# Patient Record
Sex: MALE | Race: WHITE | Employment: FULL TIME | URBAN - METROPOLITAN AREA
[De-identification: names, ages, dates, MRNs, and addresses within clinical notes are randomized per-mention and may not be internally consistent; named-entity substitution may affect disease eponyms.]

---

## 2017-02-03 PROBLEM — L71.8 OCULAR ROSACEA: Status: ACTIVE | Noted: 2017-02-03

## 2018-01-08 ENCOUNTER — LAB ENCOUNTER (OUTPATIENT)
Dept: LAB | Age: 58
End: 2018-01-08
Attending: FAMILY MEDICINE
Payer: COMMERCIAL

## 2018-01-08 DIAGNOSIS — J30.2 SEASONAL ALLERGIC RHINITIS: ICD-10-CM

## 2018-01-08 DIAGNOSIS — N40.0 BENIGN ENLARGEMENT OF PROSTATE: Primary | ICD-10-CM

## 2018-01-08 DIAGNOSIS — M79.605 LEFT LEG PAIN: ICD-10-CM

## 2018-01-08 DIAGNOSIS — E78.5 HYPERLIPEMIA: ICD-10-CM

## 2018-01-08 LAB
ALBUMIN SERPL-MCNC: 4.1 G/DL (ref 3.5–4.8)
ALP LIVER SERPL-CCNC: 76 U/L (ref 45–117)
ALT SERPL-CCNC: 32 U/L (ref 17–63)
AST SERPL-CCNC: 21 U/L (ref 15–41)
BILIRUB SERPL-MCNC: 0.8 MG/DL (ref 0.1–2)
BUN BLD-MCNC: 15 MG/DL (ref 8–20)
CALCIUM BLD-MCNC: 9.1 MG/DL (ref 8.3–10.3)
CHLORIDE: 108 MMOL/L (ref 101–111)
CHOLEST SMN-MCNC: 209 MG/DL (ref ?–200)
CO2: 25 MMOL/L (ref 22–32)
CREAT BLD-MCNC: 0.96 MG/DL (ref 0.7–1.3)
GLUCOSE BLD-MCNC: 95 MG/DL (ref 70–99)
HDLC SERPL-MCNC: 55 MG/DL (ref 45–?)
HDLC SERPL: 3.8 {RATIO} (ref ?–4.97)
LDLC SERPL CALC-MCNC: 139 MG/DL (ref ?–130)
M PROTEIN MFR SERPL ELPH: 7.2 G/DL (ref 6.1–8.3)
NONHDLC SERPL-MCNC: 154 MG/DL (ref ?–130)
POTASSIUM SERPL-SCNC: 4.1 MMOL/L (ref 3.6–5.1)
SODIUM SERPL-SCNC: 141 MMOL/L (ref 136–144)
TRIGL SERPL-MCNC: 73 MG/DL (ref ?–150)
VLDLC SERPL CALC-MCNC: 15 MG/DL (ref 5–40)

## 2018-01-08 PROCEDURE — 80053 COMPREHEN METABOLIC PANEL: CPT

## 2018-01-08 PROCEDURE — 80061 LIPID PANEL: CPT

## 2018-01-08 PROCEDURE — 36415 COLL VENOUS BLD VENIPUNCTURE: CPT

## 2018-06-21 ENCOUNTER — LAB ENCOUNTER (OUTPATIENT)
Dept: LAB | Age: 58
End: 2018-06-21
Attending: FAMILY MEDICINE
Payer: COMMERCIAL

## 2018-06-21 DIAGNOSIS — Z13.89 ENCOUNTER FOR ROUTINE SCREENING FOR MALFORMATION USING ULTRASONICS: ICD-10-CM

## 2018-06-21 DIAGNOSIS — Z00.00 ROUTINE GENERAL MEDICAL EXAMINATION AT A HEALTH CARE FACILITY: Primary | ICD-10-CM

## 2018-06-21 DIAGNOSIS — Z12.5 SPECIAL SCREENING FOR MALIGNANT NEOPLASM OF PROSTATE: ICD-10-CM

## 2018-06-21 PROCEDURE — 84443 ASSAY THYROID STIM HORMONE: CPT

## 2018-06-21 PROCEDURE — 80061 LIPID PANEL: CPT

## 2018-06-21 PROCEDURE — 85025 COMPLETE CBC W/AUTO DIFF WBC: CPT

## 2018-06-21 PROCEDURE — 84153 ASSAY OF PSA TOTAL: CPT

## 2018-06-21 PROCEDURE — 80053 COMPREHEN METABOLIC PANEL: CPT

## 2018-12-21 ENCOUNTER — LAB ENCOUNTER (OUTPATIENT)
Dept: LAB | Age: 58
End: 2018-12-21
Attending: FAMILY MEDICINE
Payer: COMMERCIAL

## 2018-12-21 DIAGNOSIS — E78.5 HYPERLIPEMIA: ICD-10-CM

## 2018-12-21 DIAGNOSIS — R53.83 FATIGUE: ICD-10-CM

## 2018-12-21 DIAGNOSIS — R68.89 MECHANICAL AND MOTOR PROBLEMS WITH INTERNAL ORGANS: ICD-10-CM

## 2018-12-21 DIAGNOSIS — N52.9 IMPOTENCE OF ORGANIC ORIGIN: Primary | ICD-10-CM

## 2018-12-21 PROCEDURE — 82607 VITAMIN B-12: CPT

## 2018-12-21 PROCEDURE — 80053 COMPREHEN METABOLIC PANEL: CPT

## 2018-12-21 PROCEDURE — 80061 LIPID PANEL: CPT

## 2019-02-25 ENCOUNTER — HOSPITAL ENCOUNTER (INPATIENT)
Facility: HOSPITAL | Age: 59
LOS: 2 days | Discharge: HOME | DRG: 661 | End: 2019-02-27
Attending: EMERGENCY MEDICINE | Admitting: HOSPITALIST
Payer: COMMERCIAL

## 2019-02-25 ENCOUNTER — HOSPITAL ENCOUNTER (OUTPATIENT)
Dept: CT IMAGING | Facility: HOSPITAL | Age: 59
Discharge: HOME | End: 2019-02-25
Attending: FAMILY MEDICINE
Payer: COMMERCIAL

## 2019-02-25 ENCOUNTER — LAB REQUISITION (OUTPATIENT)
Dept: LAB | Facility: HOSPITAL | Age: 59
End: 2019-02-25
Payer: COMMERCIAL

## 2019-02-25 DIAGNOSIS — N20.0 RENAL STONE: ICD-10-CM

## 2019-02-25 DIAGNOSIS — R10.9 FLANK PAIN: ICD-10-CM

## 2019-02-25 DIAGNOSIS — N20.0 CALCULUS OF KIDNEY: ICD-10-CM

## 2019-02-25 DIAGNOSIS — N23 RENAL COLIC: Primary | ICD-10-CM

## 2019-02-25 DIAGNOSIS — R10.9 ABDOMINAL PAIN: ICD-10-CM

## 2019-02-25 DIAGNOSIS — N20.1 CALCULI, URETER: ICD-10-CM

## 2019-02-25 LAB
ALBUMIN SERPL-MCNC: 3.9 G/DL (ref 3.4–5)
ALBUMIN/GLOB SERPL: 1.2 {RATIO} (ref 1–2)
ALP LIVER SERPL-CCNC: 79 U/L (ref 45–117)
ALT SERPL-CCNC: 32 U/L (ref 16–61)
ANION GAP SERPL CALC-SCNC: 8 MMOL/L (ref 0–18)
AST SERPL-CCNC: 17 U/L (ref 15–37)
BASOPHILS # BLD AUTO: 0.02 X10(3) UL (ref 0–0.2)
BASOPHILS NFR BLD AUTO: 0.3 %
BILIRUB SERPL-MCNC: 0.5 MG/DL (ref 0.1–2)
BILIRUB UR QL STRIP.AUTO: NEGATIVE
BILIRUB UR QL STRIP.AUTO: NEGATIVE
BUN BLD-MCNC: 14 MG/DL (ref 7–18)
BUN/CREAT SERPL: 12.6 (ref 10–20)
CALCIUM BLD-MCNC: 8.7 MG/DL (ref 8.5–10.1)
CHLORIDE SERPL-SCNC: 108 MMOL/L (ref 98–107)
CLARITY UR REFRACT.AUTO: CLEAR
CLARITY UR REFRACT.AUTO: CLEAR
CO2 SERPL-SCNC: 26 MMOL/L (ref 21–32)
COLOR UR AUTO: COLORLESS
CREAT BLD-MCNC: 1.11 MG/DL (ref 0.7–1.3)
DEPRECATED RDW RBC AUTO: 41.8 FL (ref 35.1–46.3)
EOSINOPHIL # BLD AUTO: 0.08 X10(3) UL (ref 0–0.7)
EOSINOPHIL NFR BLD AUTO: 1.4 %
ERYTHROCYTE [DISTWIDTH] IN BLOOD BY AUTOMATED COUNT: 12.4 % (ref 11–15)
GLOBULIN PLAS-MCNC: 3.3 G/DL (ref 2.8–4.4)
GLUCOSE BLD-MCNC: 133 MG/DL (ref 70–99)
GLUCOSE UR STRIP.AUTO-MCNC: NEGATIVE MG/DL
GLUCOSE UR STRIP.AUTO-MCNC: NEGATIVE MG/DL
HCT VFR BLD AUTO: 39.2 % (ref 39–53)
HGB BLD-MCNC: 13.8 G/DL (ref 13–17.5)
IMM GRANULOCYTES # BLD AUTO: 0.01 X10(3) UL (ref 0–1)
IMM GRANULOCYTES NFR BLD: 0.2 %
KETONES UR STRIP.AUTO-MCNC: NEGATIVE MG/DL
KETONES UR STRIP.AUTO-MCNC: NEGATIVE MG/DL
LEUKOCYTE ESTERASE UR QL STRIP.AUTO: NEGATIVE
LYMPHOCYTES # BLD AUTO: 1.43 X10(3) UL (ref 1–4)
LYMPHOCYTES NFR BLD AUTO: 24.4 %
M PROTEIN MFR SERPL ELPH: 7.2 G/DL (ref 6.4–8.2)
MCH RBC QN AUTO: 32.5 PG (ref 26–34)
MCHC RBC AUTO-ENTMCNC: 35.2 G/DL (ref 31–37)
MCV RBC AUTO: 92.5 FL (ref 80–100)
MONOCYTES # BLD AUTO: 0.51 X10(3) UL (ref 0.1–1)
MONOCYTES NFR BLD AUTO: 8.7 %
NEUTROPHILS # BLD AUTO: 3.81 X10 (3) UL (ref 1.5–7.7)
NEUTROPHILS # BLD AUTO: 3.81 X10(3) UL (ref 1.5–7.7)
NEUTROPHILS NFR BLD AUTO: 65 %
NITRITE UR QL STRIP.AUTO: NEGATIVE
NITRITE UR QL STRIP.AUTO: NEGATIVE
OSMOLALITY SERPL CALC.SUM OF ELEC: 296 MOSM/KG (ref 275–295)
PH UR STRIP.AUTO: 5 [PH] (ref 4.5–8)
PH UR STRIP.AUTO: 6 [PH] (ref 4.5–8)
PLATELET # BLD AUTO: 286 10(3)UL (ref 150–450)
POTASSIUM SERPL-SCNC: 3.6 MMOL/L (ref 3.5–5.1)
PROT UR STRIP.AUTO-MCNC: NEGATIVE MG/DL
PROT UR STRIP.AUTO-MCNC: NEGATIVE MG/DL
RBC # BLD AUTO: 4.24 X10(6)UL (ref 4.3–5.7)
RBC #/AREA URNS AUTO: >10 /HPF
SODIUM SERPL-SCNC: 142 MMOL/L (ref 136–145)
SP GR UR STRIP.AUTO: 1.01 (ref 1–1.03)
SP GR UR STRIP.AUTO: <1.005 (ref 1–1.03)
UROBILINOGEN UR STRIP.AUTO-MCNC: <2 MG/DL
UROBILINOGEN UR STRIP.AUTO-MCNC: <2 MG/DL
WBC # BLD AUTO: 5.9 X10(3) UL (ref 4–11)

## 2019-02-25 PROCEDURE — 74176 CT ABD & PELVIS W/O CONTRAST: CPT | Performed by: FAMILY MEDICINE

## 2019-02-25 PROCEDURE — 87086 URINE CULTURE/COLONY COUNT: CPT | Performed by: FAMILY MEDICINE

## 2019-02-25 PROCEDURE — 99223 1ST HOSP IP/OBS HIGH 75: CPT | Performed by: HOSPITALIST

## 2019-02-25 PROCEDURE — 81001 URINALYSIS AUTO W/SCOPE: CPT | Performed by: FAMILY MEDICINE

## 2019-02-25 RX ORDER — HYDROMORPHONE HYDROCHLORIDE 1 MG/ML
0.5 INJECTION, SOLUTION INTRAMUSCULAR; INTRAVENOUS; SUBCUTANEOUS EVERY 30 MIN PRN
Status: DISCONTINUED | OUTPATIENT
Start: 2019-02-25 | End: 2019-02-26

## 2019-02-25 RX ORDER — LORATADINE 10 MG/1
10 TABLET ORAL DAILY
COMMUNITY

## 2019-02-25 RX ORDER — MINERAL OIL, PETROLATUM 425; 568 MG/G; MG/G
OINTMENT OPHTHALMIC AS NEEDED
COMMUNITY
End: 2021-11-29

## 2019-02-25 RX ORDER — KETOROLAC TROMETHAMINE 30 MG/ML
15 INJECTION, SOLUTION INTRAMUSCULAR; INTRAVENOUS ONCE
Status: COMPLETED | OUTPATIENT
Start: 2019-02-25 | End: 2019-02-25

## 2019-02-25 RX ORDER — LOTEPREDNOL ETABONATE 5 MG/ML
1 SUSPENSION/ DROPS OPHTHALMIC 2 TIMES DAILY
COMMUNITY
End: 2020-11-19

## 2019-02-25 RX ORDER — SODIUM CHLORIDE 9 MG/ML
INJECTION, SOLUTION INTRAVENOUS CONTINUOUS
Status: DISCONTINUED | OUTPATIENT
Start: 2019-02-25 | End: 2019-02-26

## 2019-02-26 ENCOUNTER — APPOINTMENT (OUTPATIENT)
Dept: GENERAL RADIOLOGY | Facility: HOSPITAL | Age: 59
DRG: 661 | End: 2019-02-26
Attending: UROLOGY
Payer: COMMERCIAL

## 2019-02-26 ENCOUNTER — ANESTHESIA EVENT (OUTPATIENT)
Dept: SURGERY | Facility: HOSPITAL | Age: 59
DRG: 661 | End: 2019-02-26
Payer: COMMERCIAL

## 2019-02-26 ENCOUNTER — ANESTHESIA (OUTPATIENT)
Dept: SURGERY | Facility: HOSPITAL | Age: 59
DRG: 661 | End: 2019-02-26
Payer: COMMERCIAL

## 2019-02-26 LAB — POTASSIUM SERPL-SCNC: 4.4 MMOL/L (ref 3.5–5.1)

## 2019-02-26 PROCEDURE — 0T778DZ DILATION OF LEFT URETER WITH INTRALUMINAL DEVICE, VIA NATURAL OR ARTIFICIAL OPENING ENDOSCOPIC: ICD-10-PCS | Performed by: UROLOGY

## 2019-02-26 PROCEDURE — BT141ZZ FLUOROSCOPY OF KIDNEYS, URETERS AND BLADDER USING LOW OSMOLAR CONTRAST: ICD-10-PCS | Performed by: UROLOGY

## 2019-02-26 PROCEDURE — 99232 SBSQ HOSP IP/OBS MODERATE 35: CPT | Performed by: HOSPITALIST

## 2019-02-26 DEVICE — STENT URET 6F 28CM ULSMTH: Type: IMPLANTABLE DEVICE | Status: FUNCTIONAL

## 2019-02-26 RX ORDER — METOCLOPRAMIDE HYDROCHLORIDE 5 MG/ML
10 INJECTION INTRAMUSCULAR; INTRAVENOUS AS NEEDED
Status: DISCONTINUED | OUTPATIENT
Start: 2019-02-26 | End: 2019-02-26 | Stop reason: HOSPADM

## 2019-02-26 RX ORDER — IBUPROFEN 400 MG/1
400 TABLET ORAL EVERY 4 HOURS PRN
Status: DISCONTINUED | OUTPATIENT
Start: 2019-02-26 | End: 2019-02-27

## 2019-02-26 RX ORDER — ONDANSETRON 2 MG/ML
4 INJECTION INTRAMUSCULAR; INTRAVENOUS EVERY 6 HOURS PRN
Status: DISCONTINUED | OUTPATIENT
Start: 2019-02-26 | End: 2019-02-27

## 2019-02-26 RX ORDER — CETIRIZINE HYDROCHLORIDE 10 MG/1
10 TABLET ORAL DAILY
Status: DISCONTINUED | OUTPATIENT
Start: 2019-02-26 | End: 2019-02-27

## 2019-02-26 RX ORDER — ACETAMINOPHEN 325 MG/1
650 TABLET ORAL EVERY 6 HOURS PRN
Status: DISCONTINUED | OUTPATIENT
Start: 2019-02-26 | End: 2019-02-27

## 2019-02-26 RX ORDER — POLYETHYLENE GLYCOL 3350 17 G/17G
17 POWDER, FOR SOLUTION ORAL DAILY PRN
Status: DISCONTINUED | OUTPATIENT
Start: 2019-02-26 | End: 2019-02-27

## 2019-02-26 RX ORDER — MORPHINE SULFATE 4 MG/ML
4 INJECTION, SOLUTION INTRAMUSCULAR; INTRAVENOUS EVERY 2 HOUR PRN
Status: DISCONTINUED | OUTPATIENT
Start: 2019-02-26 | End: 2019-02-27

## 2019-02-26 RX ORDER — SODIUM CHLORIDE 9 MG/ML
INJECTION, SOLUTION INTRAVENOUS CONTINUOUS
Status: DISCONTINUED | OUTPATIENT
Start: 2019-02-26 | End: 2019-02-27

## 2019-02-26 RX ORDER — BISACODYL 10 MG
10 SUPPOSITORY, RECTAL RECTAL
Status: DISCONTINUED | OUTPATIENT
Start: 2019-02-26 | End: 2019-02-27

## 2019-02-26 RX ORDER — ATORVASTATIN CALCIUM 10 MG/1
10 TABLET, FILM COATED ORAL NIGHTLY
Status: DISCONTINUED | OUTPATIENT
Start: 2019-02-26 | End: 2019-02-27

## 2019-02-26 RX ORDER — DEXAMETHASONE SODIUM PHOSPHATE 4 MG/ML
4 VIAL (ML) INJECTION AS NEEDED
Status: DISCONTINUED | OUTPATIENT
Start: 2019-02-26 | End: 2019-02-26 | Stop reason: HOSPADM

## 2019-02-26 RX ORDER — HYDROCODONE BITARTRATE AND ACETAMINOPHEN 5; 325 MG/1; MG/1
2 TABLET ORAL AS NEEDED
Status: DISCONTINUED | OUTPATIENT
Start: 2019-02-26 | End: 2019-02-26 | Stop reason: HOSPADM

## 2019-02-26 RX ORDER — PREDNISOLONE ACETATE 10 MG/ML
1 SUSPENSION/ DROPS OPHTHALMIC
Status: DISCONTINUED | OUTPATIENT
Start: 2019-02-26 | End: 2019-02-27

## 2019-02-26 RX ORDER — SODIUM CHLORIDE, SODIUM LACTATE, POTASSIUM CHLORIDE, CALCIUM CHLORIDE 600; 310; 30; 20 MG/100ML; MG/100ML; MG/100ML; MG/100ML
INJECTION, SOLUTION INTRAVENOUS CONTINUOUS
Status: DISCONTINUED | OUTPATIENT
Start: 2019-02-26 | End: 2019-02-26 | Stop reason: HOSPADM

## 2019-02-26 RX ORDER — LABETALOL HYDROCHLORIDE 5 MG/ML
5 INJECTION, SOLUTION INTRAVENOUS EVERY 5 MIN PRN
Status: DISCONTINUED | OUTPATIENT
Start: 2019-02-26 | End: 2019-02-26 | Stop reason: HOSPADM

## 2019-02-26 RX ORDER — MORPHINE SULFATE 4 MG/ML
2 INJECTION, SOLUTION INTRAMUSCULAR; INTRAVENOUS EVERY 2 HOUR PRN
Status: DISCONTINUED | OUTPATIENT
Start: 2019-02-26 | End: 2019-02-27

## 2019-02-26 RX ORDER — CEFAZOLIN SODIUM/WATER 2 G/20 ML
SYRINGE (ML) INTRAVENOUS
Status: DISCONTINUED | OUTPATIENT
Start: 2019-02-26 | End: 2019-02-26 | Stop reason: HOSPADM

## 2019-02-26 RX ORDER — DOCUSATE SODIUM 100 MG/1
100 CAPSULE, LIQUID FILLED ORAL 2 TIMES DAILY
Status: DISCONTINUED | OUTPATIENT
Start: 2019-02-26 | End: 2019-02-27

## 2019-02-26 RX ORDER — SODIUM PHOSPHATE, DIBASIC AND SODIUM PHOSPHATE, MONOBASIC 7; 19 G/133ML; G/133ML
1 ENEMA RECTAL ONCE AS NEEDED
Status: DISCONTINUED | OUTPATIENT
Start: 2019-02-26 | End: 2019-02-27

## 2019-02-26 RX ORDER — ACETAMINOPHEN 500 MG
1000 TABLET ORAL ONCE AS NEEDED
Status: DISCONTINUED | OUTPATIENT
Start: 2019-02-26 | End: 2019-02-26 | Stop reason: HOSPADM

## 2019-02-26 RX ORDER — NALOXONE HYDROCHLORIDE 0.4 MG/ML
80 INJECTION, SOLUTION INTRAMUSCULAR; INTRAVENOUS; SUBCUTANEOUS AS NEEDED
Status: DISCONTINUED | OUTPATIENT
Start: 2019-02-26 | End: 2019-02-26 | Stop reason: HOSPADM

## 2019-02-26 RX ORDER — METOCLOPRAMIDE HYDROCHLORIDE 5 MG/ML
10 INJECTION INTRAMUSCULAR; INTRAVENOUS EVERY 8 HOURS PRN
Status: DISCONTINUED | OUTPATIENT
Start: 2019-02-26 | End: 2019-02-27

## 2019-02-26 RX ORDER — IBUPROFEN 200 MG
200 TABLET ORAL EVERY 4 HOURS PRN
Status: DISCONTINUED | OUTPATIENT
Start: 2019-02-26 | End: 2019-02-27

## 2019-02-26 RX ORDER — MORPHINE SULFATE 4 MG/ML
1 INJECTION, SOLUTION INTRAMUSCULAR; INTRAVENOUS EVERY 2 HOUR PRN
Status: DISCONTINUED | OUTPATIENT
Start: 2019-02-26 | End: 2019-02-27

## 2019-02-26 RX ORDER — IBUPROFEN 600 MG/1
600 TABLET ORAL EVERY 4 HOURS PRN
Status: DISCONTINUED | OUTPATIENT
Start: 2019-02-26 | End: 2019-02-27

## 2019-02-26 RX ORDER — LIDOCAINE HYDROCHLORIDE 20 MG/ML
JELLY TOPICAL AS NEEDED
Status: DISCONTINUED | OUTPATIENT
Start: 2019-02-26 | End: 2019-02-26 | Stop reason: HOSPADM

## 2019-02-26 RX ORDER — ONDANSETRON 2 MG/ML
4 INJECTION INTRAMUSCULAR; INTRAVENOUS AS NEEDED
Status: DISCONTINUED | OUTPATIENT
Start: 2019-02-26 | End: 2019-02-26 | Stop reason: HOSPADM

## 2019-02-26 RX ORDER — HYDROMORPHONE HYDROCHLORIDE 1 MG/ML
0.4 INJECTION, SOLUTION INTRAMUSCULAR; INTRAVENOUS; SUBCUTANEOUS EVERY 5 MIN PRN
Status: DISCONTINUED | OUTPATIENT
Start: 2019-02-26 | End: 2019-02-26 | Stop reason: HOSPADM

## 2019-02-26 RX ORDER — CEFAZOLIN SODIUM/WATER 2 G/20 ML
2 SYRINGE (ML) INTRAVENOUS
Status: DISPENSED | OUTPATIENT
Start: 2019-02-26 | End: 2019-02-27

## 2019-02-26 RX ORDER — HYDROCODONE BITARTRATE AND ACETAMINOPHEN 5; 325 MG/1; MG/1
1 TABLET ORAL AS NEEDED
Status: DISCONTINUED | OUTPATIENT
Start: 2019-02-26 | End: 2019-02-26 | Stop reason: HOSPADM

## 2019-02-26 NOTE — ANESTHESIA POSTPROCEDURE EVALUATION
[facility] 75. Patient Status:  Inpatient   Age/Gender 62year old male MRN [id number]   [facility] SURGERY Attending Amberly Darian, [facility] Se Day # 1 PCP Neema Reagan MD       Anesthesia Post-op Note    Procedu

## 2019-02-26 NOTE — PLAN OF CARE
NURSING ADMISSION NOTE      Patient admitted via ER via stretcher. Oriented to room. Safety precautions initiated. Bed in low position. Call light in reach. Patient denied pain/n/v. Kept strict NPO. Voiding freely. VSS. Safety maintained.  Manoj

## 2019-02-26 NOTE — ED INITIAL ASSESSMENT (HPI)
Right side flank pain since earlier today, nausea. Pt was seen earlier by PMD, CT with confirmed Kidney stone, here for admission per urology.

## 2019-02-26 NOTE — H&P
[facility]  History and Physical     ella Severs Patient Status:  Inpatient    1960 MRN [id number]   [facility] 3NW-A Attending Nicky Hinojosa MD   Hosp Day # 1 PCP Claudia Campos MD     Chief Complaint: flank pa capsule Rfl: 2   Loteprednol Etabonate 0.5 % Ophthalmic Suspension Place 1 drop into both eyes 2 (two) times daily. Disp:  Rfl:    loratadine 10 MG Oral Tab Take 10 mg by mouth daily. Disp:  Rfl:    REFRESH LACRI-LUBE Ophthalmic Ointment as needed.  Disp: consult for cystoscopy  2. DL- statin   3.  Hyperglycemia- stress related    PCP Dr. Stacy Malone but pt is [facility]     Quality:  · DVT Prophylaxis: ambulate  · CODE status: full  · Alas: no  Plan of care discussed with patient     Sacha Savage MD  2/26/2019

## 2019-02-26 NOTE — BRIEF OP NOTE
Pre-Operative Diagnosis: BILATERAL URETERAL CALCULI     Post-Operative Diagnosis: LEFTT URETERAL CALCULUS, HYDRONEPHROSIS, PYURIA      Procedure Performed:   Procedure(s):  CYSTOSCOPY, BILATERAL RETROGRADE PYELOGRAMS, LEFT URETERAL STENT INSERTION    Surge

## 2019-02-26 NOTE — ANESTHESIA PREPROCEDURE EVALUATION
PRE-OP EVALUATION    Patient Name: Jitendra Rviera    Pre-op Diagnosis: Calculi, ureter [N20.1]    Procedure(s):  CYSTOSCOPY, BILATERAL RETROGRADE PYELOGRAMS, BILATERAL UTEROSCOPY WITH LASER LITHTRIPSY, INSERTION OF BILATERAL STENTS    Surgeon(s) and R sodium chloride 0.9% IV bolus 1,000 mL 1,000 mL Intravenous Once   [COMPLETED] ketorolac tromethamine (TORADOL) 30 MG/ML injection 15 mg 15 mg Intravenous Once   [] 0.9%  NaCl infusion  Intravenous Continuous   [] HYDROmorphone HCl (DILAUDID) 02/25/2019    RDW 12.4 02/25/2019    .0 02/25/2019     Lab Results   Component Value Date     02/25/2019    K 4.4 02/26/2019     (H) 02/25/2019    CO2 26.0 02/25/2019    BUN 14 02/25/2019    CREATSERUM 1.11 02/25/2019     (H) 02/2

## 2019-02-26 NOTE — PROGRESS NOTES
[facility]  Progress Note     Amarilis Tafoya Patient Status:  Inpatient    1960 MRN [id number]   [facility] 3NW-A Attending Nia Yee MD   Hosp Day # 1 PCP Leona Son MD     Chief Complaint: renal colic    S: clear by KRISHNA    Quality:  · DVT Prophylaxis: none  · CODE status: full  · Alas: no  · Central line: no    Estimated date of discharge: TBD  Discharge is dependent on: clinical stability  At this point Mr. Douglas Alcala is expected to be discharge to: home

## 2019-02-26 NOTE — CONSULTS
[facility] Urology   Consultation Note    Lanis Rank Patient Status:  Inpatient    1960 MRN [id number]   [facility] 3NW-A Attending Royal Gregory MD   Hosp Day # 1 PCP Anita Young MD      [facility] SERVICES, [facility] per day  •  atorvastatin (LIPITOR) tab 10 mg, 10 mg, Oral, Nightly  •  0.9%  NaCl infusion, , Intravenous, Continuous  •  acetaminophen (TYLENOL) tab 650 mg, 650 mg, Oral, Q6H PRN  •  ibuprofen (MOTRIN) tab 200 mg, 200 mg, Oral, Q4H PRN **OR** ibuprofen (M 02/25/2019     02/25/2019    CA 8.7 02/25/2019    ALB 3.9 02/25/2019    ALKPHO 79 02/25/2019    BILT 0.5 02/25/2019    TP 7.2 02/25/2019    AST 17 02/25/2019    ALT 32 02/25/2019     UA 2/25/19: 1-4 WBC/hpf, 0-2 RBC/hpf  UA 2/25/19: 1-4 WBC/hpf, >10 ureterovesical junction.      Impression:  Patient Active Problem List:     Allergic rhinitis, cause unspecified     Psychosexual dysfunction with inhibited sexual excitement     Anxiety state, unspecified     Donor of unspecified organ or tissue     Delgado Sanches infection. Also, the scope and laser are capable of perforating the ureter or kidney which could result in a scar or stricture requiring future procedures. These considerations were thoroughly discussed with the patient.       Recommend a cystourethroscopy,

## 2019-02-26 NOTE — ED PROVIDER NOTES
Patient Seen in: [facility] Emergency Department    History   Patient presents with:  Abdomen/Flank Pain (GI/)    Stated Complaint: c/o bilateral flank pain, kidney stone on CT    HPI    CHIEF COMPLAINT: Bilateral flank pain    HISTORY OF PRESENT SURGICAL HISTORY      sinus sx   • TONSILLECTOMY             Social History    Tobacco Use      Smoking status: Former Smoker    Alcohol use: Yes      Comment: occasionally    Drug use: No      Review of Systems    Positive for stated complaint: c/o bilate Leukocyte Esterase Urine Trace (*)     RBC URINE >10 (*)     Mucous Urine 1+ (*)     All other components within normal limits   CBC W/ DIFFERENTIAL - Abnormal; Notable for the following components:    RBC 4.24 (*)     All other components within normal and agree with admission and plan. I answered all of the patient's questions prior to admission.   Admission disposition: 2/25/2019 10:09 PM             Disposition and Plan     Clinical Impression:  Renal colic  (primary encounter diagnosis)    Disposition

## 2019-02-27 VITALS
DIASTOLIC BLOOD PRESSURE: 82 MMHG | RESPIRATION RATE: 18 BRPM | SYSTOLIC BLOOD PRESSURE: 135 MMHG | HEART RATE: 66 BPM | BODY MASS INDEX: 25 KG/M2 | WEIGHT: 174.19 LBS | OXYGEN SATURATION: 100 % | TEMPERATURE: 98 F

## 2019-02-27 PROCEDURE — 99239 HOSP IP/OBS DSCHRG MGMT >30: CPT | Performed by: INTERNAL MEDICINE

## 2019-02-27 RX ORDER — ACETAMINOPHEN AND CODEINE PHOSPHATE 300; 30 MG/1; MG/1
1 TABLET ORAL EVERY 4 HOURS PRN
Qty: 20 TABLET | Refills: 0 | Status: SHIPPED | OUTPATIENT
Start: 2019-02-27 | End: 2019-05-07 | Stop reason: ALTCHOICE

## 2019-02-27 RX ORDER — CEPHALEXIN 500 MG/1
500 CAPSULE ORAL 3 TIMES DAILY
Qty: 42 CAPSULE | Refills: 0 | Status: SHIPPED | OUTPATIENT
Start: 2019-02-27 | End: 2019-03-13

## 2019-02-27 NOTE — PROGRESS NOTES
[facility]  Progress Note     Dianne Alaniz Patient Status:  Inpatient    1960 MRN [id number]   [facility] 3NW-A Attending Nolan Taveras MD   Hosp Day # 2 PCP Brandy Bynum MD     Chief Complaint: renal colic    S: control  3. abx  2. Left renal stone 1.3cm  3. 4mm right UVJ    Plan of care: DC planning    Quality:  · DVT Prophylaxis: none  · CODE status: full  · Alas: no  · Central line: no    Estimated date of discharge:  Today  Discharge is dependent on: clinical

## 2019-02-27 NOTE — OPERATIVE REPORT
[facility]    PATIENT'S NAME: Figueroa Lawson   ATTENDING PHYSICIAN: Omkar Arenas MD   OPERATING PHYSICIAN: Brock Cohen M.D.    PATIENT ACCOUNT#:   [de-identified]    LOCATION:  [address] [place] [address]  MEDICAL RECORD #:   [id number]       DATE OF BIRTH: be passed up the ureter next to the stone; however, would not pass beyond the stone. An open-ended catheter was passed over the wire. Next, further attempts were made to pass the wire past the stone.   We were eventually able to get the wire past, and pur

## 2019-02-27 NOTE — PLAN OF CARE
Patient AOX4. Voided upon arrival back to floor. Complaints of left flank pain, declines pain medication at this time. POC discussed, will try clears/diet. Possible d/c home if o.k. With surgery/urology. No orders from urology at this time.

## 2019-02-27 NOTE — DISCHARGE SUMMARY
[facility]  DISCHARGE SUMMARY     Angy Gracia Patient Status:  Inpatient    1960 MRN [id number]   [facility] 3NW-A Attending Madeleine Matt MD   Hosp Day # 2 PCP Ignacio Lima MD     Date of Admission: 2019 this afternoon      Take 1 capsule (500 mg total) by mouth 3 (three) times daily for 14 days.    Stop taking on:  3/13/2019  Quantity:  42 capsule  Refills:  0        CONTINUE taking these medications      Instructions Prescription details   Bimatoprost 0.0 Vital signs:  Temp:  [98.2 °F (36.8 °C)] 98.2 °F (36.8 °C)  Pulse:  [66] 66  Resp:  [18] 18  BP: 135/(65) 135/82      -----------------------------------------------------------------------------------------------  PATIENT DISCHARGE INSTRUCTIONS: S

## 2019-02-27 NOTE — PLAN OF CARE
Pt seen this am by Urology PA & Dr Eliane Burr, orders for discahrge home noted. Pt tolerating diet, afebile. IV removed, site clear. Discharge instructions given. Mediations of Keflex & Tylenol #3 medications given to pt by [employer].  discharged to

## 2019-02-27 NOTE — PROGRESS NOTES
[facility]  Urology Progress Note    Yenny Hastingsheidi Patient Status:  Inpatient    1960 MRN [id number]   [facility] 3NW-A Attending Eyad George MD   Hosp Day # 2 PCP Lori Elam MD     Subjective:  Masoud Pedrue

## 2019-03-01 LAB
CALCULI MASS: 25 MG
CALCULI NUMBER: 1

## 2019-03-08 ENCOUNTER — EKG ENCOUNTER (OUTPATIENT)
Dept: LAB | Facility: HOSPITAL | Age: 59
End: 2019-03-08
Attending: UROLOGY
Payer: COMMERCIAL

## 2019-03-08 DIAGNOSIS — N20.0 RENAL STONE: ICD-10-CM

## 2019-03-08 DIAGNOSIS — Z01.818 OTHER SPECIFIED PRE-OPERATIVE EXAMINATION: Primary | ICD-10-CM

## 2019-03-08 DIAGNOSIS — E78.5 HYPERLIPIDEMIA: ICD-10-CM

## 2019-03-08 LAB
ATRIAL RATE: 73 BPM
P AXIS: 62 DEGREES
P-R INTERVAL: 126 MS
Q-T INTERVAL: 366 MS
QRS DURATION: 88 MS
QTC CALCULATION (BEZET): 403 MS
R AXIS: 53 DEGREES
T AXIS: 31 DEGREES
VENTRICULAR RATE: 73 BPM

## 2019-03-08 PROCEDURE — 93005 ELECTROCARDIOGRAM TRACING: CPT

## 2019-03-08 PROCEDURE — 93010 ELECTROCARDIOGRAM REPORT: CPT | Performed by: INTERNAL MEDICINE

## 2019-06-15 PROCEDURE — 82340 ASSAY OF CALCIUM IN URINE: CPT | Performed by: UROLOGY

## 2019-06-15 PROCEDURE — 83945 ASSAY OF OXALATE: CPT | Performed by: UROLOGY

## 2019-06-15 PROCEDURE — 84392 ASSAY OF URINE SULFATE: CPT | Performed by: UROLOGY

## 2019-06-15 PROCEDURE — 82507 ASSAY OF CITRATE: CPT | Performed by: UROLOGY

## 2019-06-15 PROCEDURE — 82436 ASSAY OF URINE CHLORIDE: CPT | Performed by: UROLOGY

## 2019-06-24 ENCOUNTER — LAB ENCOUNTER (OUTPATIENT)
Dept: LAB | Age: 59
End: 2019-06-24
Attending: FAMILY MEDICINE
Payer: COMMERCIAL

## 2019-06-24 DIAGNOSIS — Z13.89 ENCOUNTER FOR ROUTINE SCREENING FOR MALFORMATION USING ULTRASONICS: ICD-10-CM

## 2019-06-24 DIAGNOSIS — Z00.00 ROUTINE GENERAL MEDICAL EXAMINATION AT A HEALTH CARE FACILITY: Primary | ICD-10-CM

## 2019-06-24 LAB
ABSOLUTE IMMATURE GRANULOCYTES (OFFPRE24): NORMAL
ALBUMIN SERPL-MCNC: 4.3 G/DL
ALBUMIN/GLOB SERPL: 1.5 {RATIO}
ALP SERPL-CCNC: 81 U/L
ALT SERPL-CCNC: 32 U/L
ANION GAP SERPL CALC-SCNC: NORMAL MMOL/L
AST SERPL-CCNC: 20 U/L
BASO+EOS+MONOS # BLD: NORMAL 10*3/UL
BASO+EOS+MONOS NFR BLD: NORMAL %
BASOPHILS # BLD: NORMAL 10*3/UL
BASOPHILS NFR BLD: NORMAL %
BILIRUB SERPL-MCNC: 0.5 MG/DL
BUN SERPL-MCNC: 14 MG/DL
BUN/CREAT SERPL: NORMAL
CALCIUM SERPL-MCNC: 9 MG/DL
CHLORIDE SERPL-SCNC: 109 MMOL/L
CHOLEST SERPL-MCNC: 169 MG/DL
CHOLEST/HDLC SERPL: NORMAL {RATIO}
CO2 SERPL-SCNC: NORMAL MMOL/L
CREAT SERPL-MCNC: 0.92 MG/DL
DIFFERENTIAL METHOD BLD: NORMAL
EOSINOPHIL # BLD: NORMAL 10*3/UL
EOSINOPHIL NFR BLD: NORMAL %
ERYTHROCYTE [DISTWIDTH] IN BLOOD: NORMAL %
GLOBULIN SER-MCNC: 2.8 G/DL
GLUCOSE SERPL-MCNC: 81 MG/DL
HCT VFR BLD CALC: 43.1 %
HDLC SERPL-MCNC: 60 MG/DL
HGB BLD-MCNC: 13.9 G/DL
IMMATURE GRANULOCYTES (OFFPRE25): NORMAL
LDLC SERPL CALC-MCNC: 95 MG/DL
LENGTH OF FAST TIME PATIENT: NORMAL H
LENGTH OF FAST TIME PATIENT: NORMAL H
LYMPHOCYTES # BLD: NORMAL 10*3/UL
LYMPHOCYTES NFR BLD: NORMAL %
MCH RBC QN AUTO: NORMAL PG
MCHC RBC AUTO-ENTMCNC: NORMAL G/DL
MCV RBC AUTO: NORMAL FL
MONOCYTES # BLD: NORMAL 10*3/UL
MONOCYTES NFR BLD: NORMAL %
MPV (OFFPRE2): NORMAL
NEUTROPHILS # BLD: NORMAL 10*3/UL
NEUTROPHILS NFR BLD: NORMAL %
NONHDLC SERPL-MCNC: 109 MG/DL
NRBC BLD MANUAL-RTO: NORMAL %
PLAT MORPH BLD: NORMAL
PLATELET # BLD: 289 10*3/UL
POTASSIUM SERPL-SCNC: 4.4 MMOL/L
PROT SERPL-MCNC: 7.1 G/DL
RBC # BLD: 4.36 10*6/UL
RBC MORPH BLD: NORMAL
SODIUM SERPL-SCNC: 141 MMOL/L
TRIGL SERPL-MCNC: 68 MG/DL
TSH SERPL-ACNC: 3.15 M[IU]/L
VLDLC SERPL CALC-MCNC: 14 MG/DL
WBC # BLD: 4.5 10*3/UL
WBC MORPH BLD: NORMAL

## 2019-06-24 PROCEDURE — 84443 ASSAY THYROID STIM HORMONE: CPT

## 2019-06-24 PROCEDURE — 85025 COMPLETE CBC W/AUTO DIFF WBC: CPT

## 2019-06-24 PROCEDURE — 80053 COMPREHEN METABOLIC PANEL: CPT

## 2019-06-24 PROCEDURE — 80061 LIPID PANEL: CPT

## 2019-06-24 PROCEDURE — 82607 VITAMIN B-12: CPT

## 2019-10-30 ENCOUNTER — HOSPITAL ENCOUNTER (OUTPATIENT)
Dept: ULTRASOUND IMAGING | Facility: HOSPITAL | Age: 59
Discharge: HOME | End: 2019-10-30
Attending: FAMILY MEDICINE
Payer: COMMERCIAL

## 2019-10-30 DIAGNOSIS — I65.29 STENOSIS OF CAROTID ARTERY, UNSPECIFIED LATERALITY: ICD-10-CM

## 2019-10-30 PROCEDURE — 93880 EXTRACRANIAL BILAT STUDY: CPT | Performed by: FAMILY MEDICINE

## 2019-11-26 ENCOUNTER — HOSPITAL ENCOUNTER (OUTPATIENT)
Dept: GENERAL RADIOLOGY | Facility: HOSPITAL | Age: 59
Discharge: HOME | End: 2019-11-26
Attending: FAMILY MEDICINE
Payer: COMMERCIAL

## 2019-11-26 DIAGNOSIS — M53.3 DISORDER OF SI (SACROILIAC) JOINT: ICD-10-CM

## 2019-11-26 DIAGNOSIS — S39.012A STRAIN OF LUMBAR REGION: ICD-10-CM

## 2019-11-26 PROCEDURE — 72110 X-RAY EXAM L-2 SPINE 4/>VWS: CPT | Performed by: FAMILY MEDICINE

## 2019-11-26 PROCEDURE — 73502 X-RAY EXAM HIP UNI 2-3 VIEWS: CPT | Performed by: FAMILY MEDICINE

## 2019-12-11 ENCOUNTER — OFFICE VISIT (OUTPATIENT)
Dept: CARDIOLOGY | Age: 59
End: 2019-12-11

## 2019-12-11 VITALS
SYSTOLIC BLOOD PRESSURE: 122 MMHG | BODY MASS INDEX: 24.34 KG/M2 | HEART RATE: 78 BPM | WEIGHT: 170 LBS | HEIGHT: 70 IN | RESPIRATION RATE: 12 BRPM | DIASTOLIC BLOOD PRESSURE: 80 MMHG

## 2019-12-11 DIAGNOSIS — R55 SYNCOPE AND COLLAPSE: Primary | ICD-10-CM

## 2019-12-11 PROCEDURE — 99205 OFFICE O/P NEW HI 60 MIN: CPT | Performed by: INTERNAL MEDICINE

## 2019-12-11 PROCEDURE — 93000 ELECTROCARDIOGRAM COMPLETE: CPT | Performed by: INTERNAL MEDICINE

## 2019-12-11 RX ORDER — CYCLOBENZAPRINE HCL 5 MG
5 TABLET ORAL DAILY
Refills: 0 | COMMUNITY
Start: 2019-12-02

## 2019-12-11 RX ORDER — CELECOXIB 200 MG/1
200 CAPSULE ORAL DAILY
Refills: 0 | COMMUNITY
Start: 2019-12-02

## 2019-12-11 RX ORDER — SIMVASTATIN 5 MG
5 TABLET ORAL DAILY
Refills: 0 | COMMUNITY
Start: 2019-10-21

## 2019-12-11 RX ORDER — AMOXICILLIN 500 MG/1
500 CAPSULE ORAL 2 TIMES DAILY
COMMUNITY

## 2019-12-11 SDOH — HEALTH STABILITY: PHYSICAL HEALTH: ON AVERAGE, HOW MANY MINUTES DO YOU ENGAGE IN EXERCISE AT THIS LEVEL?: 60 MIN

## 2019-12-11 SDOH — HEALTH STABILITY: PHYSICAL HEALTH: ON AVERAGE, HOW MANY DAYS PER WEEK DO YOU ENGAGE IN MODERATE TO STRENUOUS EXERCISE (LIKE A BRISK WALK)?: 4 DAYS

## 2019-12-17 ENCOUNTER — CLINICAL ABSTRACT (OUTPATIENT)
Dept: CARDIOLOGY | Age: 59
End: 2019-12-17

## 2020-01-31 ENCOUNTER — APPOINTMENT (OUTPATIENT)
Dept: CARDIOLOGY | Age: 60
End: 2020-01-31

## 2020-03-26 ENCOUNTER — APPOINTMENT (OUTPATIENT)
Dept: CARDIOLOGY | Age: 60
End: 2020-03-26

## 2020-05-06 ENCOUNTER — TELEPHONE (OUTPATIENT)
Dept: CARDIOLOGY | Age: 60
End: 2020-05-06

## 2020-06-26 ENCOUNTER — OFFICE VISIT (OUTPATIENT)
Dept: CARDIOLOGY | Age: 60
End: 2020-06-26

## 2020-06-26 VITALS — BODY MASS INDEX: 24.39 KG/M2 | HEIGHT: 70 IN

## 2020-06-26 DIAGNOSIS — R55 SYNCOPE AND COLLAPSE: Primary | ICD-10-CM

## 2020-06-26 PROCEDURE — 99213 OFFICE O/P EST LOW 20 MIN: CPT | Performed by: INTERNAL MEDICINE

## 2020-07-01 ENCOUNTER — HOSPITAL ENCOUNTER (OUTPATIENT)
Dept: CV DIAGNOSTICS | Facility: HOSPITAL | Age: 60
Discharge: HOME | End: 2020-07-01
Attending: INTERNAL MEDICINE
Payer: COMMERCIAL

## 2020-07-01 DIAGNOSIS — R55 SYNCOPE AND COLLAPSE: ICD-10-CM

## 2020-07-01 PROCEDURE — 93306 TTE W/DOPPLER COMPLETE: CPT | Performed by: INTERNAL MEDICINE

## 2020-07-09 ENCOUNTER — TELEPHONE (OUTPATIENT)
Dept: CARDIOLOGY | Age: 60
End: 2020-07-09

## 2020-10-24 ENCOUNTER — APPOINTMENT (OUTPATIENT)
Dept: LAB | Facility: HOSPITAL | Age: 60
End: 2020-10-24
Attending: INTERNAL MEDICINE
Payer: COMMERCIAL

## 2020-10-24 DIAGNOSIS — Z01.818 PRE-OP TESTING: ICD-10-CM

## 2020-10-27 PROBLEM — Z12.11 SPECIAL SCREENING FOR MALIGNANT NEOPLASM OF COLON: Status: ACTIVE | Noted: 2020-10-27

## 2020-10-27 PROBLEM — K63.5 COLON POLYPS: Status: ACTIVE | Noted: 2020-10-27

## 2020-10-27 PROBLEM — K64.8 INTERNAL HEMORRHOIDS: Status: ACTIVE | Noted: 2020-10-27

## 2020-10-27 PROCEDURE — 88305 TISSUE EXAM BY PATHOLOGIST: CPT | Performed by: INTERNAL MEDICINE

## 2020-11-19 ENCOUNTER — OFFICE VISIT (OUTPATIENT)
Dept: PODIATRY CLINIC | Facility: CLINIC | Age: 60
End: 2020-11-19

## 2020-11-19 DIAGNOSIS — M72.2 PLANTAR FASCIITIS: Primary | ICD-10-CM

## 2020-11-19 PROCEDURE — 76882 US LMTD JT/FCL EVL NVASC XTR: CPT | Performed by: PODIATRIST

## 2020-11-19 PROCEDURE — 99203 OFFICE O/P NEW LOW 30 MIN: CPT | Performed by: PODIATRIST

## 2020-11-19 RX ORDER — CYCLOBENZAPRINE HCL 5 MG
TABLET ORAL
COMMUNITY
Start: 2020-09-01 | End: 2021-11-29

## 2020-11-19 NOTE — PROGRESS NOTES
Jade Braswell is a 61year old male. Patient presents with:  New Patient: right heel pain - has had cortisone injection and custom inserts - tried to play soccer, but pain came back - Old DPM retired.         HPI:     Today for follow-up evaluation [facility]   • OTHER SURGICAL HISTORY      varicose vein to left testicle   • OTHER SURGICAL HISTORY      sinus sx   • OTHER SURGICAL HISTORY  03/21/2019    Cysto/ Stent Removal [facility] Page   • TONSILLECTOMY        Family History   Problem Relation A will get a get him into a night splint and that was dispensed this date in addition we will going to initiate physical therapy.   In addition the [facility] brochure was dispensed to the patient diagnostic ultrasound was done this date does show the fascia

## 2020-11-30 ENCOUNTER — TELEPHONE (OUTPATIENT)
Dept: PODIATRY CLINIC | Facility: CLINIC | Age: 60
End: 2020-11-30

## 2020-11-30 NOTE — TELEPHONE ENCOUNTER
[facility] is not in network with [facility]. Patient can go to [facility] or [facility] locations. Please advise where patient will be going and referral can be updated.      Thank you,  [facility]

## 2020-11-30 NOTE — TELEPHONE ENCOUNTER
S/w pt and he would like to go to [facility] [address] [facility] in [place]. I informed him he would be notified of approval via [employer] once ready.

## 2020-12-11 ENCOUNTER — ORDER TRANSCRIPTION (OUTPATIENT)
Dept: PHYSICAL THERAPY | Facility: HOSPITAL | Age: 60
End: 2020-12-11

## 2020-12-11 DIAGNOSIS — M72.2 PLANTAR FASCIAL FIBROMATOSIS: Primary | ICD-10-CM

## 2020-12-17 ENCOUNTER — OFFICE VISIT (OUTPATIENT)
Dept: PODIATRY CLINIC | Facility: CLINIC | Age: 60
End: 2020-12-17

## 2020-12-17 DIAGNOSIS — M72.2 PLANTAR FASCIITIS: Primary | ICD-10-CM

## 2020-12-17 PROCEDURE — L4361 PNEUMA/VAC WALK BOOT PRE OTS: HCPCS | Performed by: PODIATRIST

## 2020-12-17 PROCEDURE — 99213 OFFICE O/P EST LOW 20 MIN: CPT | Performed by: PODIATRIST

## 2020-12-18 ENCOUNTER — TELEPHONE (OUTPATIENT)
Dept: PODIATRY CLINIC | Facility: CLINIC | Age: 60
End: 2020-12-18

## 2020-12-18 DIAGNOSIS — M72.2 PLANTAR FASCIITIS: Primary | ICD-10-CM

## 2020-12-18 RX ORDER — MELOXICAM 15 MG/1
15 TABLET ORAL DAILY
Qty: 30 TABLET | Refills: 0 | Status: SHIPPED | OUTPATIENT
Start: 2020-12-18 | End: 2021-11-29

## 2020-12-18 NOTE — TELEPHONE ENCOUNTER
Regarding: Prescription Question  Contact: [phone]  ----- Message from Cyrus Farrar RN sent at 12/18/2020 12:00 PM CST -----       ----- Message from Juan Dubon to Rebecca Skaggs DPM sent at 12/18/2020 11:44 AM -----   Hi Dr Anam Montana

## 2020-12-18 NOTE — PROGRESS NOTES
Stefan Morales is a 61year old male. Patient presents with: Follow - Up: right foot ck - on and off pain scale 9/10 - has not started PT yet.          HPI:     Is here for follow-up evaluation still complaining of pain and discomfort in that foot and Disorder Mother       Social History    Socioeconomic History      Marital status:       Spouse name: Not on file      Number of children: Not on file      Years of education: Not on file      Highest education level: Not on file    Tobacco Use issues and agrees to the plan. Return in about 4 weeks (around 1/14/2021).     Lidia Kelley DPM  12/18/2020

## 2020-12-29 ENCOUNTER — OFFICE VISIT (OUTPATIENT)
Dept: PHYSICAL THERAPY | Age: 60
End: 2020-12-29
Attending: PODIATRIST
Payer: COMMERCIAL

## 2020-12-29 DIAGNOSIS — M72.2 PLANTAR FASCIAL FIBROMATOSIS: ICD-10-CM

## 2020-12-29 PROCEDURE — 97110 THERAPEUTIC EXERCISES: CPT

## 2020-12-29 PROCEDURE — 97161 PT EVAL LOW COMPLEX 20 MIN: CPT

## 2020-12-29 NOTE — PROGRESS NOTES
LOWER EXTREMITY EVALUATION:   Referring Physician: Dr. Yancy Vaughan  Diagnosis:  R plantar fascitiis Date of Service: 12/29/2020     PATIENT SUMMARY   Yenny Cervantes is a 61year old male who presents to therapy today with complaints of  Pain  In the R None  OBJECTIVE:   Observation:  Rounded shoulders with forward head posture   Palpation: none today- none in the plantar foot- however pt did not have pain upon arrival   Sensation:  Intact- does report a \"cramp\" that occurs in the post thigh at night t and evolving symptoms including changing pain levels. PLAN OF CARE:    Goals: (to be met in 10 visits)  1 . Pt to be I with HEP  2. Increase glut and hip strength one grade on the R for improved push off and gait /running  3.  Increase R great toe ext to 7

## 2021-01-05 ENCOUNTER — OFFICE VISIT (OUTPATIENT)
Dept: PHYSICAL THERAPY | Age: 61
End: 2021-01-05
Attending: PODIATRIST
Payer: COMMERCIAL

## 2021-01-05 DIAGNOSIS — M72.2 PLANTAR FASCIAL FIBROMATOSIS: ICD-10-CM

## 2021-01-05 PROCEDURE — 97110 THERAPEUTIC EXERCISES: CPT

## 2021-01-05 NOTE — PROGRESS NOTES
Dx:  R plantar fascitis         Insurance (Authorized # of Visits):  8           Authorizing Physician: Dr. Haley Hope  Next MD visit: none scheduled  Fall Risk: standard         Precautions: n/a             Subjective:  Pt has been  Wearing his gym shoes in

## 2021-01-07 ENCOUNTER — OFFICE VISIT (OUTPATIENT)
Dept: PHYSICAL THERAPY | Age: 61
End: 2021-01-07
Attending: PODIATRIST
Payer: COMMERCIAL

## 2021-01-07 PROCEDURE — 97110 THERAPEUTIC EXERCISES: CPT

## 2021-01-07 NOTE — PROGRESS NOTES
Dx:  R plantar fascitis         Insurance (Authorized # of Visits):  8           Authorizing Physician: Dr. Nathan Orantes  Next MD visit: none scheduled  Fall Risk: standard         Precautions: n/a             Subjective:  Pt has been  Wearing his gym shoes in

## 2021-01-12 ENCOUNTER — OFFICE VISIT (OUTPATIENT)
Dept: PHYSICAL THERAPY | Age: 61
End: 2021-01-12
Attending: PODIATRIST
Payer: COMMERCIAL

## 2021-01-12 PROCEDURE — 97110 THERAPEUTIC EXERCISES: CPT

## 2021-01-13 NOTE — PROGRESS NOTES
Dx:  R plantar fascitis         Insurance (Authorized # of Visits):  8           Authorizing Physician: Dr. Randolph Cowden  Next MD visit: none scheduled  Fall Risk: standard         Precautions: n/a             Subjective:  Feeling a lot better, not feeling joycelyn arch with LT 5 mins   KT R ECRB 2 mins  Clams in side lying GTb 10 x 2 each side   Cont with stretches at home                      HEP:  Clams at home, cont with stretches at home, ice as needed     Charges: EX 3       Total Timed Treatment: 45 min  Total

## 2021-01-14 ENCOUNTER — OFFICE VISIT (OUTPATIENT)
Dept: PHYSICAL THERAPY | Age: 61
End: 2021-01-14
Attending: PODIATRIST
Payer: COMMERCIAL

## 2021-01-14 PROCEDURE — 97110 THERAPEUTIC EXERCISES: CPT

## 2021-01-19 ENCOUNTER — OFFICE VISIT (OUTPATIENT)
Dept: PHYSICAL THERAPY | Age: 61
End: 2021-01-19
Attending: PODIATRIST
Payer: COMMERCIAL

## 2021-01-19 PROCEDURE — 97110 THERAPEUTIC EXERCISES: CPT

## 2021-01-19 NOTE — PROGRESS NOTES
Dx:  R plantar fascitis         Insurance (Authorized # of Visits):  8           Authorizing Physician: Dr. Alicia Rainey  Next MD visit: none scheduled  Fall Risk: standard         Precautions: n/a            Progress Summary  Pt has attended 6 visits in [facility] through no HHA  SL foam with ball toss 10 x each side SL  Foam ball rolling in front 10 x each no HHA  gastroc and soleus stretch 3 x 30 secs B   STM R arch 10 mins  Taped with LT to increase support  5 mins  Ice as needed at home    nustep 5 mins   Thorac

## 2021-01-21 ENCOUNTER — APPOINTMENT (OUTPATIENT)
Dept: PHYSICAL THERAPY | Age: 61
End: 2021-01-21
Attending: PODIATRIST
Payer: COMMERCIAL

## 2021-01-22 ENCOUNTER — TELEPHONE (OUTPATIENT)
Dept: PODIATRY CLINIC | Facility: CLINIC | Age: 61
End: 2021-01-22

## 2021-01-22 ENCOUNTER — TELEPHONE (OUTPATIENT)
Dept: PHYSICAL THERAPY | Facility: HOSPITAL | Age: 61
End: 2021-01-22

## 2021-01-22 DIAGNOSIS — M72.2 PLANTAR FASCIAL FIBROMATOSIS: Primary | ICD-10-CM

## 2021-01-22 NOTE — TELEPHONE ENCOUNTER
Physical therapy for patient needs more referrals for visits. Requesting 8 visits.  Patient has followup PT on 1-26-21

## 2021-01-26 ENCOUNTER — OFFICE VISIT (OUTPATIENT)
Dept: PHYSICAL THERAPY | Age: 61
End: 2021-01-26
Attending: PODIATRIST
Payer: COMMERCIAL

## 2021-01-26 PROCEDURE — 97110 THERAPEUTIC EXERCISES: CPT

## 2021-01-26 NOTE — PROGRESS NOTES
Dx:  R plantar fascitis         Insurance (Authorized # of Visits):  8           Authorizing Physician: Dr. Sabiha Vigil  Next MD visit: none scheduled  Fall Risk: standard         Precautions: n/a                Subjective:    1/10 in the R heel.  No pain the Thoracic roller: ext over the roll  ITB and piriformis B 10 mins  Tennis ball plantar foot in standing 3 mins   Cupping R gastroc  STM /cupping plantar R foot 10 mins  Taping R foot LT 5 mins  Reformer squats and PF 4 cords 10 x 2 each  Upright bike 5 mi

## 2021-02-04 ENCOUNTER — OFFICE VISIT (OUTPATIENT)
Dept: PHYSICAL THERAPY | Age: 61
End: 2021-02-04
Attending: PODIATRIST
Payer: COMMERCIAL

## 2021-02-04 PROCEDURE — 97110 THERAPEUTIC EXERCISES: CPT

## 2021-02-04 NOTE — PROGRESS NOTES
Dx:  R plantar fascitis         Insurance (Authorized # of Visits):  8           Authorizing Physician: Dr. Betzaida Rodriguez  Next MD visit: none scheduled  Fall Risk: standard         Precautions: n/a                Subjective:    1/10 in the R heel.  No pain the support  5 mins  Ice as needed at home    nustep 5 mins   Thoracic roller: ext over the roll  ITB and piriformis B 10 mins  Tennis ball plantar foot in standing 3 mins   Cupping R gastroc  STM /cupping plantar R foot 10 mins  Taping R foot LT 5 mins  Refor

## 2021-02-11 ENCOUNTER — APPOINTMENT (OUTPATIENT)
Dept: PHYSICAL THERAPY | Age: 61
End: 2021-02-11
Attending: PODIATRIST
Payer: COMMERCIAL

## 2021-02-12 ENCOUNTER — OFFICE VISIT (OUTPATIENT)
Dept: PHYSICAL THERAPY | Age: 61
End: 2021-02-12
Attending: PODIATRIST
Payer: COMMERCIAL

## 2021-02-12 PROCEDURE — 97110 THERAPEUTIC EXERCISES: CPT

## 2021-02-12 NOTE — PROGRESS NOTES
Dx:  R plantar fascitis         Insurance (Authorized # of Visits):  8           Authorizing Physician: Dr. Mellisa Silverman  Next MD visit: none scheduled  Fall Risk: standard         Precautions: n/a                Subjective:    Pt is feeling better.  No pain in plantar foot in standing 3 mins   Cupping R gastroc  STM /cupping plantar R foot 10 mins  Taping R foot LT 5 mins  Reformer squats and PF 4 cords 10 x 2 each  Upright bike 5 mins   Reformer 3-4 cords B jumps 10 x 2   Bosu: 24 count up and overs x 2 sets  S

## 2021-02-18 ENCOUNTER — OFFICE VISIT (OUTPATIENT)
Dept: PHYSICAL THERAPY | Age: 61
End: 2021-02-18
Attending: PODIATRIST
Payer: COMMERCIAL

## 2021-02-18 PROCEDURE — 97110 THERAPEUTIC EXERCISES: CPT

## 2021-02-18 NOTE — PROGRESS NOTES
Dx:  R plantar fascitis         Insurance (Authorized # of Visits):  8           Authorizing Physician: Dr. Betzaida Rodriguez  Next MD visit: none scheduled  Fall Risk: standard         Precautions: n/a               Discharge Summary  Pt has attended 9 visits in P gastroc  STM /cupping plantar R foot 10 mins  Taping R foot LT 5 mins  Reformer squats and PF 4 cords 10 x 2 each  Upright bike 5 mins   Reformer 3-4 cords B jumps 10 x 2   Bosu: 24 count up and overs x 2 sets  Squats 10 x 2 on bosu   gastroc and soleus st

## 2021-02-19 ENCOUNTER — APPOINTMENT (OUTPATIENT)
Dept: PHYSICAL THERAPY | Age: 61
End: 2021-02-19
Attending: PODIATRIST
Payer: COMMERCIAL

## 2021-02-26 ENCOUNTER — HOSPITAL ENCOUNTER (OUTPATIENT)
Dept: ULTRASOUND IMAGING | Facility: HOSPITAL | Age: 61
Discharge: HOME | End: 2021-02-26
Attending: FAMILY MEDICINE
Payer: COMMERCIAL

## 2021-02-26 DIAGNOSIS — M25.561 RIGHT KNEE PAIN: ICD-10-CM

## 2021-02-26 PROCEDURE — 93971 EXTREMITY STUDY: CPT | Performed by: FAMILY MEDICINE

## 2021-06-06 ENCOUNTER — LAB ENCOUNTER (OUTPATIENT)
Dept: LAB | Facility: HOSPITAL | Age: 61
End: 2021-06-06
Attending: FAMILY MEDICINE
Payer: COMMERCIAL

## 2021-06-06 DIAGNOSIS — R19.5 ABNORMAL FECES: Primary | ICD-10-CM

## 2021-06-06 PROCEDURE — 87177 OVA AND PARASITES SMEARS: CPT

## 2021-06-06 PROCEDURE — 87272 CRYPTOSPORIDIUM AG IF: CPT

## 2021-06-06 PROCEDURE — 87329 GIARDIA AG IA: CPT

## 2021-06-06 PROCEDURE — 87209 SMEAR COMPLEX STAIN: CPT

## 2021-07-02 ENCOUNTER — APPOINTMENT (OUTPATIENT)
Dept: CARDIOLOGY | Age: 61
End: 2021-07-02

## 2022-02-14 NOTE — PROGRESS NOTES
Dx:  R plantar fascitis         Insurance (Authorized # of Visits):  8           Authorizing Physician: Dr. Martell Michel  Next MD visit: none scheduled  Fall Risk: standard         Precautions: n/a             Subjective:  Feeling a lot better, not feeling joycelyn HHA  SL foam with ball toss 10 x each side SL  Foam ball rolling in front 10 x each no HHA  gastroc and soleus stretch 3 x 30 secs B   STM R arch 10 mins  Taped with LT to increase support  5 mins  Ice as needed at home    nustep 5 mins   Thoracic roller: 4 = No assist / stand by assistance

## 2022-04-04 ENCOUNTER — HOSPITAL ENCOUNTER (OUTPATIENT)
Dept: GENERAL RADIOLOGY | Facility: HOSPITAL | Age: 62
Discharge: HOME | End: 2022-04-04
Attending: FAMILY MEDICINE
Payer: COMMERCIAL

## 2022-04-04 DIAGNOSIS — M25.552 LEFT HIP PAIN: ICD-10-CM

## 2022-04-04 PROCEDURE — 73502 X-RAY EXAM HIP UNI 2-3 VIEWS: CPT | Performed by: FAMILY MEDICINE

## 2022-04-14 ENCOUNTER — HOSPITAL ENCOUNTER (OUTPATIENT)
Dept: GENERAL RADIOLOGY | Facility: HOSPITAL | Age: 62
Discharge: HOME | End: 2022-04-14
Attending: FAMILY MEDICINE
Payer: COMMERCIAL

## 2022-04-14 DIAGNOSIS — M25.572 ACUTE LEFT ANKLE PAIN: ICD-10-CM

## 2022-04-14 DIAGNOSIS — M25.562 LEFT ANTERIOR KNEE PAIN: ICD-10-CM

## 2022-04-14 PROCEDURE — 73560 X-RAY EXAM OF KNEE 1 OR 2: CPT | Performed by: FAMILY MEDICINE

## 2022-04-14 PROCEDURE — 73610 X-RAY EXAM OF ANKLE: CPT | Performed by: FAMILY MEDICINE

## 2022-04-22 ENCOUNTER — TELEPHONE (OUTPATIENT)
Dept: ORTHOPEDICS CLINIC | Facility: CLINIC | Age: 62
End: 2022-04-22

## 2022-04-22 NOTE — TELEPHONE ENCOUNTER
Spoke with patient who stated that he is having some ankle AND foot pain. Pt notified that left foot xrays will be placed as well, and he can have them completed before the appt. Pt verbalized understanding. No further questions at this time.

## 2022-05-04 ENCOUNTER — OFFICE VISIT (OUTPATIENT)
Dept: ORTHOPEDICS CLINIC | Facility: CLINIC | Age: 62
End: 2022-05-04
Payer: COMMERCIAL

## 2022-05-04 DIAGNOSIS — S93.402A MODERATE LEFT ANKLE SPRAIN, INITIAL ENCOUNTER: ICD-10-CM

## 2022-05-04 DIAGNOSIS — M22.41 CHONDROMALACIA OF RIGHT PATELLA: Primary | ICD-10-CM

## 2022-05-04 PROCEDURE — 99243 OFF/OP CNSLTJ NEW/EST LOW 30: CPT | Performed by: ORTHOPAEDIC SURGERY

## 2022-05-04 RX ORDER — CYCLOBENZAPRINE HCL 5 MG
TABLET ORAL
COMMUNITY
Start: 2022-03-10

## 2022-05-04 RX ORDER — MELOXICAM 15 MG/1
TABLET ORAL
COMMUNITY
Start: 2022-03-10

## 2022-05-04 RX ORDER — SIMVASTATIN 20 MG
20 TABLET ORAL EVERY EVENING
COMMUNITY
Start: 2022-02-11

## 2022-05-11 ENCOUNTER — HOSPITAL ENCOUNTER (OUTPATIENT)
Dept: GENERAL RADIOLOGY | Age: 62
Discharge: HOME | End: 2022-05-11
Attending: PODIATRIST
Payer: COMMERCIAL

## 2022-05-11 ENCOUNTER — OFFICE VISIT (OUTPATIENT)
Dept: ORTHOPEDICS CLINIC | Facility: CLINIC | Age: 62
End: 2022-05-11
Payer: COMMERCIAL

## 2022-05-11 VITALS — HEART RATE: 71 BPM | OXYGEN SATURATION: 98 % | WEIGHT: 180 LBS | HEIGHT: 69 IN | BODY MASS INDEX: 26.66 KG/M2

## 2022-05-11 DIAGNOSIS — M79.89 SWELLING OF LEFT FOOT: ICD-10-CM

## 2022-05-11 DIAGNOSIS — M19.079 ARTHRITIS, MIDFOOT: Primary | ICD-10-CM

## 2022-05-11 DIAGNOSIS — M72.2 PLANTAR FASCIITIS: ICD-10-CM

## 2022-05-11 DIAGNOSIS — M79.672 LEFT FOOT PAIN: ICD-10-CM

## 2022-05-11 PROCEDURE — 99203 OFFICE O/P NEW LOW 30 MIN: CPT | Performed by: PODIATRIST

## 2022-05-11 PROCEDURE — 73630 X-RAY EXAM OF FOOT: CPT | Performed by: PODIATRIST

## 2022-05-11 PROCEDURE — 3008F BODY MASS INDEX DOCD: CPT | Performed by: PODIATRIST

## 2022-12-08 ENCOUNTER — HOSPITAL ENCOUNTER (OUTPATIENT)
Dept: GENERAL RADIOLOGY | Facility: HOSPITAL | Age: 62
Discharge: HOME | End: 2022-12-08
Attending: FAMILY MEDICINE
Payer: COMMERCIAL

## 2022-12-08 DIAGNOSIS — M79.645 FINGER PAIN, LEFT: ICD-10-CM

## 2022-12-08 PROCEDURE — 73130 X-RAY EXAM OF HAND: CPT | Performed by: FAMILY MEDICINE

## 2023-01-16 ENCOUNTER — TELEPHONE (OUTPATIENT)
Dept: ORTHOPEDICS CLINIC | Facility: CLINIC | Age: 63
End: 2023-01-16

## 2023-01-16 NOTE — TELEPHONE ENCOUNTER
NPT scheduled with Dr. Nikki Jauregui for left pinky. Patient has imaging in epic from December 22. Please advise if additional imaging needed.     Future Appointments   Date Time Provider Lesa Perrin   2/6/2023 11:40 AM Ruben Macedo MD [facility] [id number]

## 2023-02-06 ENCOUNTER — OFFICE VISIT (OUTPATIENT)
Dept: ORTHOPEDICS CLINIC | Facility: CLINIC | Age: 63
End: 2023-02-06
Payer: COMMERCIAL

## 2023-02-06 VITALS — BODY MASS INDEX: 26.66 KG/M2 | WEIGHT: 180 LBS | HEIGHT: 69 IN

## 2023-02-06 DIAGNOSIS — M20.012 MALLET DEFORMITY OF LEFT LITTLE FINGER: Primary | ICD-10-CM

## 2023-02-06 PROCEDURE — 99203 OFFICE O/P NEW LOW 30 MIN: CPT | Performed by: ORTHOPAEDIC SURGERY

## 2023-02-06 PROCEDURE — 3008F BODY MASS INDEX DOCD: CPT | Performed by: ORTHOPAEDIC SURGERY

## 2023-02-09 ENCOUNTER — TELEPHONE (OUTPATIENT)
Dept: PHYSICAL THERAPY | Facility: HOSPITAL | Age: 63
End: 2023-02-09

## 2023-02-13 ENCOUNTER — OFFICE VISIT (OUTPATIENT)
Dept: OCCUPATIONAL MEDICINE | Age: 63
End: 2023-02-13
Attending: ORTHOPAEDIC SURGERY
Payer: COMMERCIAL

## 2023-02-13 DIAGNOSIS — M20.012 MALLET DEFORMITY OF LEFT LITTLE FINGER: ICD-10-CM

## 2023-02-13 PROCEDURE — 97760 ORTHOTIC MGMT&TRAING 1ST ENC: CPT

## 2023-04-19 ENCOUNTER — OFFICE VISIT (OUTPATIENT)
Dept: ORTHOPEDICS CLINIC | Facility: CLINIC | Age: 63
End: 2023-04-19
Payer: COMMERCIAL

## 2023-04-19 VITALS — HEIGHT: 69 IN | BODY MASS INDEX: 26.66 KG/M2 | WEIGHT: 180 LBS

## 2023-04-19 DIAGNOSIS — M79.605 CHRONIC PAIN OF LEFT LOWER EXTREMITY: Primary | ICD-10-CM

## 2023-04-19 DIAGNOSIS — G89.29 CHRONIC PAIN OF LEFT LOWER EXTREMITY: Primary | ICD-10-CM

## 2023-04-19 DIAGNOSIS — R20.2 LEFT LEG PARESTHESIAS: ICD-10-CM

## 2023-04-19 PROCEDURE — 3008F BODY MASS INDEX DOCD: CPT | Performed by: ORTHOPAEDIC SURGERY

## 2023-04-19 PROCEDURE — 99213 OFFICE O/P EST LOW 20 MIN: CPT | Performed by: ORTHOPAEDIC SURGERY

## 2023-04-20 ENCOUNTER — OFFICE VISIT (OUTPATIENT)
Dept: ORTHOPEDICS CLINIC | Facility: CLINIC | Age: 63
End: 2023-04-20
Payer: COMMERCIAL

## 2023-04-20 VITALS — HEIGHT: 69 IN | WEIGHT: 184 LBS | BODY MASS INDEX: 27.25 KG/M2

## 2023-04-20 DIAGNOSIS — M20.012 MALLET DEFORMITY OF LEFT LITTLE FINGER: Primary | ICD-10-CM

## 2023-04-20 PROCEDURE — 99213 OFFICE O/P EST LOW 20 MIN: CPT | Performed by: ORTHOPAEDIC SURGERY

## 2023-04-20 PROCEDURE — 3008F BODY MASS INDEX DOCD: CPT | Performed by: ORTHOPAEDIC SURGERY

## 2023-05-03 ENCOUNTER — HOSPITAL ENCOUNTER (EMERGENCY)
Facility: HOSPITAL | Age: 63
Discharge: HOME | End: 2023-05-03
Attending: EMERGENCY MEDICINE
Payer: COMMERCIAL

## 2023-05-03 VITALS
RESPIRATION RATE: 18 BRPM | SYSTOLIC BLOOD PRESSURE: 132 MMHG | DIASTOLIC BLOOD PRESSURE: 78 MMHG | BODY MASS INDEX: 27 KG/M2 | WEIGHT: 182 LBS | OXYGEN SATURATION: 98 % | TEMPERATURE: 98 F | HEART RATE: 81 BPM

## 2023-05-03 DIAGNOSIS — S61.412A LACERATION OF LEFT HAND WITHOUT FOREIGN BODY, INITIAL ENCOUNTER: Primary | ICD-10-CM

## 2023-05-03 PROCEDURE — 99283 EMERGENCY DEPT VISIT LOW MDM: CPT

## 2023-05-03 PROCEDURE — 12002 RPR S/N/AX/GEN/TRNK2.6-7.5CM: CPT

## 2023-05-04 NOTE — DISCHARGE INSTRUCTIONS
Wash wound gently with soap and water, keep clean and covered. Sutures should be removed in about 10 days.

## 2023-05-04 NOTE — ED INITIAL ASSESSMENT (HPI)
63YM c/c of hand lac Pt state that he cut his hand with a piece of metal today.  A small lac noted to his R hand

## 2023-05-19 ENCOUNTER — HOSPITAL ENCOUNTER (EMERGENCY)
Facility: HOSPITAL | Age: 63
Discharge: HOME | End: 2023-05-19
Attending: EMERGENCY MEDICINE
Payer: COMMERCIAL

## 2023-05-19 VITALS
DIASTOLIC BLOOD PRESSURE: 73 MMHG | SYSTOLIC BLOOD PRESSURE: 128 MMHG | TEMPERATURE: 98 F | RESPIRATION RATE: 18 BRPM | HEART RATE: 84 BPM | OXYGEN SATURATION: 100 %

## 2023-05-19 DIAGNOSIS — Z48.02 ENCOUNTER FOR REMOVAL OF SUTURES: Primary | ICD-10-CM

## 2023-07-31 ENCOUNTER — HOSPITAL ENCOUNTER (OUTPATIENT)
Dept: CV DIAGNOSTICS | Facility: HOSPITAL | Age: 63
Discharge: HOME | End: 2023-07-31
Attending: FAMILY MEDICINE
Payer: COMMERCIAL

## 2023-07-31 DIAGNOSIS — E78.5 HYPERLIPEMIA: ICD-10-CM

## 2023-07-31 DIAGNOSIS — I65.29 STENOSIS OF CAROTID ARTERY: ICD-10-CM

## 2023-07-31 PROCEDURE — 93306 TTE W/DOPPLER COMPLETE: CPT | Performed by: FAMILY MEDICINE

## 2023-08-04 ENCOUNTER — HOSPITAL ENCOUNTER (OUTPATIENT)
Dept: GENERAL RADIOLOGY | Facility: HOSPITAL | Age: 63
Discharge: HOME | End: 2023-08-04
Attending: FAMILY MEDICINE
Payer: COMMERCIAL

## 2023-08-04 DIAGNOSIS — M54.50 ACUTE LEFT-SIDED LOW BACK PAIN WITHOUT SCIATICA: ICD-10-CM

## 2023-08-04 PROCEDURE — 73502 X-RAY EXAM HIP UNI 2-3 VIEWS: CPT | Performed by: FAMILY MEDICINE

## 2023-08-04 PROCEDURE — 72110 X-RAY EXAM L-2 SPINE 4/>VWS: CPT | Performed by: FAMILY MEDICINE

## 2023-08-14 ENCOUNTER — TELEPHONE (OUTPATIENT)
Dept: ORTHOPEDICS CLINIC | Facility: CLINIC | Age: 63
End: 2023-08-14

## 2023-08-14 NOTE — TELEPHONE ENCOUNTER
Patient has an upcoming appointment for sciatica pain. Patient had xrays on 8/4 and can be viewed in Epic. Please review imaging, and advise if further imaging is required. If, so please place RX accordingly.     Future Appointments   Date Time Provider Lesa Perrin   8/15/2023  4:30 PM Tim PARKS D [facility]   8/18/2023  8:00 AM SKYLER Baptiste [facility]

## 2023-08-15 ENCOUNTER — OFFICE VISIT (OUTPATIENT)
Dept: ORTHOPEDICS CLINIC | Facility: CLINIC | Age: 63
End: 2023-08-15
Payer: COMMERCIAL

## 2023-08-15 VITALS — HEIGHT: 69 IN | WEIGHT: 180 LBS | BODY MASS INDEX: 26.66 KG/M2

## 2023-08-15 DIAGNOSIS — M54.42 CHRONIC LEFT-SIDED LOW BACK PAIN WITH LEFT-SIDED SCIATICA: Primary | ICD-10-CM

## 2023-08-15 DIAGNOSIS — G89.29 CHRONIC LEFT-SIDED LOW BACK PAIN WITH LEFT-SIDED SCIATICA: Primary | ICD-10-CM

## 2023-08-15 DIAGNOSIS — R29.2 ABSENT LEFT PATELLAR REFLEX: ICD-10-CM

## 2023-08-15 PROCEDURE — 99214 OFFICE O/P EST MOD 30 MIN: CPT | Performed by: NURSE PRACTITIONER

## 2023-08-15 PROCEDURE — 3008F BODY MASS INDEX DOCD: CPT | Performed by: NURSE PRACTITIONER

## 2023-08-15 RX ORDER — MELOXICAM 15 MG/1
15 TABLET ORAL DAILY
Qty: 30 TABLET | Refills: 0 | Status: SHIPPED | OUTPATIENT
Start: 2023-08-15

## 2023-09-12 ENCOUNTER — TELEPHONE (OUTPATIENT)
Dept: PHYSICAL THERAPY | Facility: HOSPITAL | Age: 63
End: 2023-09-12

## 2023-09-12 ENCOUNTER — HOSPITAL ENCOUNTER (OUTPATIENT)
Dept: MRI IMAGING | Facility: HOSPITAL | Age: 63
Discharge: HOME | End: 2023-09-12
Attending: NURSE PRACTITIONER
Payer: COMMERCIAL

## 2023-09-12 DIAGNOSIS — M54.42 CHRONIC LEFT-SIDED LOW BACK PAIN WITH LEFT-SIDED SCIATICA: Primary | ICD-10-CM

## 2023-09-12 DIAGNOSIS — G89.29 CHRONIC LEFT-SIDED LOW BACK PAIN WITH LEFT-SIDED SCIATICA: Primary | ICD-10-CM

## 2023-09-12 DIAGNOSIS — R20.2 LEFT LEG PARESTHESIAS: ICD-10-CM

## 2023-09-12 DIAGNOSIS — R29.2 ABSENT LEFT PATELLAR REFLEX: ICD-10-CM

## 2023-09-12 DIAGNOSIS — M54.42 CHRONIC LEFT-SIDED LOW BACK PAIN WITH LEFT-SIDED SCIATICA: ICD-10-CM

## 2023-09-12 DIAGNOSIS — G89.29 CHRONIC LEFT-SIDED LOW BACK PAIN WITH LEFT-SIDED SCIATICA: ICD-10-CM

## 2023-09-12 PROCEDURE — 72148 MRI LUMBAR SPINE W/O DYE: CPT | Performed by: NURSE PRACTITIONER

## 2023-09-15 ENCOUNTER — TELEPHONE (OUTPATIENT)
Dept: ORTHOPEDICS CLINIC | Facility: CLINIC | Age: 63
End: 2023-09-15

## 2023-09-15 DIAGNOSIS — M79.671 RIGHT FOOT PAIN: Primary | ICD-10-CM

## 2023-09-18 ENCOUNTER — OFFICE VISIT (OUTPATIENT)
Dept: ORTHOPEDICS CLINIC | Facility: CLINIC | Age: 63
End: 2023-09-18
Payer: COMMERCIAL

## 2023-09-18 VITALS — BODY MASS INDEX: 26.66 KG/M2 | HEIGHT: 69 IN | WEIGHT: 180 LBS

## 2023-09-18 DIAGNOSIS — M51.26 LUMBAR DISC HERNIATION: Primary | ICD-10-CM

## 2023-09-18 PROCEDURE — 3008F BODY MASS INDEX DOCD: CPT | Performed by: NURSE PRACTITIONER

## 2023-09-18 PROCEDURE — 99214 OFFICE O/P EST MOD 30 MIN: CPT | Performed by: NURSE PRACTITIONER

## 2023-09-19 ENCOUNTER — OFFICE VISIT (OUTPATIENT)
Dept: PHYSICAL THERAPY | Age: 63
End: 2023-09-19
Attending: NURSE PRACTITIONER
Payer: COMMERCIAL

## 2023-09-19 DIAGNOSIS — R29.2 ABSENT LEFT PATELLAR REFLEX: ICD-10-CM

## 2023-09-19 DIAGNOSIS — G89.29 CHRONIC LEFT-SIDED LOW BACK PAIN WITH LEFT-SIDED SCIATICA: Primary | ICD-10-CM

## 2023-09-19 DIAGNOSIS — M54.42 CHRONIC LEFT-SIDED LOW BACK PAIN WITH LEFT-SIDED SCIATICA: Primary | ICD-10-CM

## 2023-09-19 PROCEDURE — 97110 THERAPEUTIC EXERCISES: CPT

## 2023-09-19 PROCEDURE — 97161 PT EVAL LOW COMPLEX 20 MIN: CPT

## 2023-09-20 ENCOUNTER — HOSPITAL ENCOUNTER (OUTPATIENT)
Dept: GENERAL RADIOLOGY | Age: 63
Discharge: HOME | End: 2023-09-20
Attending: PODIATRIST
Payer: COMMERCIAL

## 2023-09-20 ENCOUNTER — OFFICE VISIT (OUTPATIENT)
Dept: ORTHOPEDICS CLINIC | Facility: CLINIC | Age: 63
End: 2023-09-20
Payer: COMMERCIAL

## 2023-09-20 VITALS — HEIGHT: 69 IN | BODY MASS INDEX: 26.66 KG/M2 | WEIGHT: 180 LBS

## 2023-09-20 DIAGNOSIS — M79.671 RIGHT FOOT PAIN: ICD-10-CM

## 2023-09-20 DIAGNOSIS — M67.873 ACHILLES TENDINOSIS OF RIGHT ANKLE: Primary | ICD-10-CM

## 2023-09-20 PROCEDURE — 3008F BODY MASS INDEX DOCD: CPT | Performed by: PODIATRIST

## 2023-09-20 PROCEDURE — 99214 OFFICE O/P EST MOD 30 MIN: CPT | Performed by: PODIATRIST

## 2023-09-20 PROCEDURE — 73630 X-RAY EXAM OF FOOT: CPT | Performed by: PODIATRIST

## 2023-09-20 NOTE — PROGRESS NOTES
[facility] Progress Note    Subjective:   Radhika Jones is here with a new issue  6 month hx of thickening of tendon right ankle  Saw him in past for PF, midfott arthritis - better with injections, PT and stretching    Now achilles tendon thickening right ankle  Hx of soccer play and hx of running  No injury that he can recall  No calf cramping    Left hip issues - been in PT for that  L4,5 issues, left sided  Hx of fall from ladder and left calf tingling and injury        Objective:     Mild atrophy right calf, supple left  Neutral arch right, flexible flatfoot left  Thickening and discomfort more so medially at watershed region achilles tendon right ankle  Tendoahilles tight  Mild swelling. No pain at the distal attachment posterior calcaneus no calf pain no mild tenderness pain. Imaging: mild midfoot joint faults  Plantar calcaneal spur          Assessment/Plan:     Diagnoses and all orders for this visit:    Achilles tendinosis of right ankle      We looked at his x-rays together and discussed the foot type with some midfoot joints in the joint faults. Discussed that that can progress and cause some arthritis from flatfoot if he does not continue with good shoes and arch support. We discussed the type of shoes that are helpful for that. Also diagnosis of Achilles tendinosis and what that is and the treatment plan  He is at risk of tear so he needs to cut back on his weightbearing exercise definitely running jumping activity  PT - for modalities, running gait evaluation    Discussed prp injection option - what it is and success rate    Follow up after PT            Carlos Dasilva. J[facility]Z [facility]    [employer] speech recognition software was used to prepare this note. If a word or phrase is confusing, it is likely do to a failure of recognition. Please contact me with any questions or clarifications.

## 2023-09-25 ENCOUNTER — OFFICE VISIT (OUTPATIENT)
Dept: PHYSICAL THERAPY | Age: 63
End: 2023-09-25
Attending: NURSE PRACTITIONER
Payer: COMMERCIAL

## 2023-09-25 PROCEDURE — 97140 MANUAL THERAPY 1/> REGIONS: CPT

## 2023-09-25 PROCEDURE — 97110 THERAPEUTIC EXERCISES: CPT

## 2023-09-25 NOTE — PROGRESS NOTES
Diagnosis:   Chronic left-sided low back pain with left-sided sciatica (M54.42,G89.29)  Absent left patellar reflex (R29.2)         Referring Provider: Nette Rosario  Date of Evaluation:     9/19/2023     Precautions:  None Next MD visit:   none scheduled  Date of Surgery: n/a   Insurance Primary/Secondary: [facility] HMO / N/A     # Auth Visits: HMO- 60 visit limit            Subjective: Pt is more mentally concerned about achilles tendon rupture since visit with podiatrist. He states his L sided symptoms- sitting at practice for soccer- without support on low back. After getting up from this- bilateral LE numbness. Aggravating: sitting prolonged, running - he will limp, going up stairs- weakness more than pain. Walking helps   Pain: 1-2/10    Objective: All there ex pain free  Shuttle: SLP; level 7: R: unremarkable; L: shaking, increased muscle twitching noted, subjectively more difficult   Slump: negative bilaterally   JOHN: R: negative; L: Positive *lateral hip     Assessment: Pt continues to be negative for nerve tension on the L . He has positive JOHN and weakness on the LLE with endurance testing on SLP. HEP updated to promote progressions to [facility] strength training. Tolerated session well. Goals: (to be met in 8 visits)   Pt will improve transversus abdominis recruitment to perform proper isometric contraction without requiring verbal or tactile cuing to promote advancement of therex  Pt will report improved symptom centralization and absence of radicular symptoms for 3 consecutive days to improve function with ADL   Pt will have decreased paraspinal mm tension to tolerate sitting >30 minutes for work and home activities   Pt will demonstrate improved core strength to be able to perform jogging 10 min with <3/10 pain   Pt will be independent and compliant with comprehensive HEP to maintain progress achieved in PT      Plan: Continue per plan of care.    Date: 9/25/2023  TX#: 2/8 Date:                 TX#: 3/ Date:                 TX#: 4/ Date:                 TX#: 5/ Date: Tx#: 6/   There ex: 25 min   Sidelying red band clam shell 10 reps x 2 sets on the L   Sit to stand 10 reps   Lateral walk red band at ankles 30' x 2 laps R/L   Monster walks 30' x 2 laps red band at ankles- forward retro each   Shuttle DLP level 7 10 reps SLP level 7 10 reps R/L          Manual: 10 min   Lateral hip glide gr IV 10 reps x 3 sets on the L   Long axis traction 10 sec hold 10 reps x 3 sets on the L                      HEP:   Access Code: [email or web address]  URL: [email or web address]  Date: 09/25/2023  Prepared by: Ta Ny    Exercises  - Supine Piriformis Stretch  - 1 x daily - 7 x weekly - 3 sets - 1 reps - 30 hold  - Side Stepping with Resistance at Ankles  - 1 x daily - 7 x weekly - 2 sets - 10 reps  - Forward Monster Walks  - 1 x daily - 7 x weekly - 2 sets - 10 reps  - Backward Monster Walks  - 1 x daily - 7 x weekly - 2 sets - 10 reps  - Squat with Chair Touch and Resistance Loop  - 1 x daily - 7 x weekly - 2 sets - 10 reps    Charges: there ex: 2 manual: 1       Total Timed Treatment: 35 min  Total Treatment Time: 35 min  Arrived 8 min late

## 2023-09-27 ENCOUNTER — OFFICE VISIT (OUTPATIENT)
Dept: PHYSICAL THERAPY | Age: 63
End: 2023-09-27
Attending: NURSE PRACTITIONER
Payer: COMMERCIAL

## 2023-09-27 PROCEDURE — 97110 THERAPEUTIC EXERCISES: CPT

## 2023-09-27 PROCEDURE — 97140 MANUAL THERAPY 1/> REGIONS: CPT

## 2023-09-27 NOTE — PROGRESS NOTES
Diagnosis:   Chronic left-sided low back pain with left-sided sciatica (M54.42,G89.29)  Absent left patellar reflex (R29.2)         Referring Provider: Constantino Clarke  Date of Evaluation:     9/19/2023     Precautions:  None Next MD visit:   none scheduled  Date of Surgery: n/a   Insurance Primary/Secondary: [facility] HMO / N/A     # Auth Visits: HMO- 60 visit limit            Subjective: Pt denies soreness or pain since last therapy session. He plan to ride train to [place] tomorrow for work. Has not done that in the last week. He completed HEP this am and experienced only muscle soreness. Chief complaint: sitting prolonged, running - he will limp, going up stairs- weakness more than pain. Walking helps   Pain: 1-2/10    Objective:   Shuttle: SLP; level 7: R: unremarkable; L: shaking, increased muscle twitching noted, subjectively more difficult   Slump: R: negative: L; increased tightness and heel pain   JOHN: R: negative; L: Positive *lateral hip - improved post treatment     Assessment: Pt with tightness during slump on the L compared to the R today. Educated in dynamic stretching. He has positive JOHN and weakness on the LLE with endurance testing on SLP. Hip flexor restriction on the L compared to the R in flexibility. HEP updated to address.        Goals: (to be met in 8 visits) Progressing toward goals 9/27/2023   Pt will improve transversus abdominis recruitment to perform proper isometric contraction without requiring verbal or tactile cuing to promote advancement of therex  Pt will report improved symptom centralization and absence of radicular symptoms for 3 consecutive days to improve function with ADL   Pt will have decreased paraspinal mm tension to tolerate sitting >30 minutes for work and home activities   Pt will demonstrate improved core strength to be able to perform jogging 10 min with <3/10 pain   Pt will be independent and compliant with comprehensive HEP to maintain progress achieved in PT      Plan: Continue per plan of care. Plan for next therapy session: work on stairs, discuss sitting tolerance for train   Date: 9/25/2023  TX#: 2/8 Date:  9/27/2023                TX#: 3/8 Date:                 TX#: 4/ Date:                 TX#: 5/ Date: Tx#: 6/   There ex: 25 min   Sidelying red band clam shell 10 reps x 2 sets on the L   Sit to stand 10 reps   Lateral walk red band at ankles 30' x 2 laps R/L   Monster walks 30' x 2 laps red band at ankles- forward retro each   Shuttle DLP level 7 10 reps SLP level 7 10 reps R/L    There ex: 30 min   Upright bike level 2- 5 min   Shuttle DLP level 7 20 reps SLP level 7 10 reps R/L x 2 sets   Hip flexor stretch 30 sec hold x 3 sets   SL bridge on ball 10 sec hold 10 reps   Review of core routine: crunches, oblique rotations, knee ins, push ups, planks, side crunches  Seated piriformis stretch 30 sec hold x 2 sets R/L   Seated sciatic nerve glides 10 reps x 2 sets on the L           Manual: 10 min   Lateral hip glide gr IV 10 reps x 3 sets on the L   Long axis traction 10 sec hold 10 reps x 3 sets on the L  Manual: 10 min   Lateral hip glide gr IV 10 reps x 3 sets on the L   Long axis traction 10 sec hold 10 reps x 3 sets on the L                     HEP:   Access Code: [email or web address]  URL: [email or web address]  Date: 09/27/2023  Prepared by: Berenice Morataya    Exercises  - Supine Piriformis Stretch  - 1 x daily - 7 x weekly - 3 sets - 1 reps - 30 hold  - Side Stepping with Resistance at Ankles  - 1 x daily - 7 x weekly - 2 sets - 10 reps  - Forward Monster Walks  - 1 x daily - 7 x weekly - 2 sets - 10 reps  - Backward Monster Walks  - 1 x daily - 7 x weekly - 2 sets - 10 reps  - Squat with Chair Touch and Resistance Loop  - 1 x daily - 7 x weekly - 2 sets - 10 reps  - Seated Sciatic Tensioner  - 1 x daily - 7 x weekly - 2 sets - 10 reps  - Half Kneeling Hip Flexor Stretch  - 1 x daily - 7 x weekly - 3 sets - 1 reps - 30 hold    Charges: there ex: 2 manual: 1       Total Timed Treatment: 40 min  Total Treatment Time: 40  min

## 2023-10-02 ENCOUNTER — OFFICE VISIT (OUTPATIENT)
Dept: PHYSICAL THERAPY | Age: 63
End: 2023-10-02
Attending: NURSE PRACTITIONER
Payer: COMMERCIAL

## 2023-10-02 PROCEDURE — 97140 MANUAL THERAPY 1/> REGIONS: CPT

## 2023-10-02 PROCEDURE — 97110 THERAPEUTIC EXERCISES: CPT

## 2023-10-02 NOTE — PROGRESS NOTES
Diagnosis:   Chronic left-sided low back pain with left-sided sciatica (M54.42,G89.29)  Absent left patellar reflex (R29.2)         Referring Provider: Mindi Nelson  Date of Evaluation:     9/19/2023     Precautions:  None Next MD visit:   none scheduled  Date of Surgery: n/a   Insurance Primary/Secondary: [address] [place] [address] HMO / N/A     # Auth Visits: HMO- 60 visit limit            Subjective: Pt denies soreness or pain. Still weakness when ascending stairs with RLE. Pt even commuted on train and had no issues standing upon arrival to [facility]. Chief complaint: sitting prolonged, running - he will limp, going up stairs- weakness more than pain. Walking helps   Pain: 1-2/10    Objective:   Shuttle: SLP; level 7: R: unremarkable; L: shaking, increased muscle twitching noted, subjectively more difficult   Slump: R: negative: L; Negative  JOHN: R: negative; L: WFL *lateral hip tightness    Assessment: Pt with negative slump, tightness in HS present on the L compared to the R. JOHN positive on the L for only tightness rather and objective difference. Pt HEP updated to address HS restrictions on the L. Goals: (to be met in 8 visits) Progressing toward goals 10/2/2023    Pt will improve transversus abdominis recruitment to perform proper isometric contraction without requiring verbal or tactile cuing to promote advancement of therex  Pt will report improved symptom centralization and absence of radicular symptoms for 3 consecutive days to improve function with ADL   Pt will have decreased paraspinal mm tension to tolerate sitting >30 minutes for work and home activities   Pt will demonstrate improved core strength to be able to perform jogging 10 min with <3/10 pain   Pt will be independent and compliant with comprehensive HEP to maintain progress achieved in PT      Plan: Continue per plan of care.  Plan for next therapy session: work on single leg step ups- large box; trx disc slide  Date: 9/25/2023  TX#: 2/8 Date:  9/27/2023 TX#: 3/8 Date:  10/2/2023                TX#: 4/8 Date:                 TX#: 5/ Date: Tx#: 6/ There ex: 25 min   Sidelying red band clam shell 10 reps x 2 sets on the L   Sit to stand 10 reps   Lateral walk red band at ankles 30' x 2 laps R/L   Monster walks 30' x 2 laps red band at ankles- forward retro each   Shuttle DLP level 7 10 reps SLP level 7 10 reps R/L    There ex: 30 min   Upright bike level 2- 5 min   Shuttle DLP level 7 20 reps SLP level 7 10 reps R/L x 2 sets   Hip flexor stretch 30 sec hold x 3 sets   SL bridge on ball 10 sec hold 10 reps   Review of core routine: crunches, oblique rotations, knee ins, push ups, planks, side crunches  Seated piriformis stretch 30 sec hold x 2 sets R/L   Seated sciatic nerve glides 10 reps x 2 sets on the L      There ex: 25 min   Shuttle DLP level 7 20 reps SLP level 7 10 reps R/L x 3 sets   Hip flexor stretch 30 sec hold x 3 sets   HS stretch 30 sec hold x 3 sets R/L   Squats 15 reps - cues for symmetrical weight shift  SL bridge on ball 10 sec hold 10 reps        Manual: 10 min   Lateral hip glide gr IV 10 reps x 3 sets on the L   Long axis traction 10 sec hold 10 reps x 3 sets on the L  Manual: 10 min   Lateral hip glide gr IV 10 reps x 3 sets on the L   Long axis traction 10 sec hold 10 reps x 3 sets on the L  Manual: 15 min   Lateral hip glide gr IV 10 reps x 4 sets on the L   Long axis traction 10 sec hold 10 reps x 4 sets on the L                    HEP:   Access Code: [email or web address]  URL: [email or web address]  Date: 09/27/2023  Prepared by: Liborio Paris    Exercises  - Supine Piriformis Stretch  - 1 x daily - 7 x weekly - 3 sets - 1 reps - 30 hold  - Side Stepping with Resistance at Ankles  - 1 x daily - 7 x weekly - 2 sets - 10 reps  - Forward Monster Walks  - 1 x daily - 7 x weekly - 2 sets - 10 reps  - Backward Monster Walks  - 1 x daily - 7 x weekly - 2 sets - 10 reps  - Squat with Chair Touch and Resistance Loop  - 1 x daily - 7 x weekly - 2 sets - 10 reps  HS stretch - 10/2/2023   - Half Kneeling Hip Flexor Stretch  - 1 x daily - 7 x weekly - 3 sets - 1 reps - 30 hold    Charges: there ex: 2 manual: 1       Total Timed Treatment: 40 min  Total Treatment Time: 40  min

## 2023-10-03 ENCOUNTER — APPOINTMENT (OUTPATIENT)
Dept: PHYSICAL THERAPY | Age: 63
End: 2023-10-03
Attending: PODIATRIST
Payer: COMMERCIAL

## 2023-10-04 ENCOUNTER — OFFICE VISIT (OUTPATIENT)
Dept: PHYSICAL THERAPY | Age: 63
End: 2023-10-04
Attending: NURSE PRACTITIONER
Payer: COMMERCIAL

## 2023-10-04 PROCEDURE — 97110 THERAPEUTIC EXERCISES: CPT

## 2023-10-04 PROCEDURE — 97140 MANUAL THERAPY 1/> REGIONS: CPT

## 2023-10-04 NOTE — PROGRESS NOTES
Diagnosis:   Chronic left-sided low back pain with left-sided sciatica (M54.42,G89.29)  Absent left patellar reflex (R29.2)         Referring Provider: Verónica Fisher  Date of Evaluation:     9/19/2023     Precautions:  None Next MD visit:   none scheduled  Date of Surgery: n/a   Insurance Primary/Secondary: [employer] [place] HMO / N/A     # Auth Visits: HMO- 60 visit limit            Subjective: Pt is feeling good today, no pain in the hip or leg. Is having some swelling in the right Achilles tendon and irritation there. Has been following doctor's orders to avoid running, training and jumping activities. Pt states that with \"running to the train\" he is limited 2/2 to proximal L thigh pain, near buttock. Chief complaint: weakness with stairs, increased pain in R Achilles   Pain: 0/10    Objective:   JOHN: R: WFL; L: WFL *lateral hip pain   Lower quarter screen:   -Light touch sensation intact besides L medial malleolus from previous injury  Reflexes:   Achilles: R: 1+; L 0  Patellar: R: 2+; L 0   -All myotomes 5/5 except L hip flexion 4/5 and L knee flexion 4+/5- improved from grossly 3+/5 in LLE    Assessment: Pt demonstrates reduced pain in LLE with ADLs IADLs. Pt has improved from gross weakness in LLE compared to the R with chief limitations still present in  L hip flexion 4/5 and L knee flexion 4+/5. Pt is tolerating increased strengthening exercises in therapy, but still has weakness in the LLE and some pain with activities. Patient would benefit from continued therapy to address LLE weakness.      Goals: (to be met in 8 visits) Progressing toward goals 10/4/2023    Pt will improve transversus abdominis recruitment to perform proper isometric contraction without requiring verbal or tactile cuing to promote advancement of therex  Pt will report improved symptom centralization and absence of radicular symptoms for 3 consecutive days to improve function with ADL   Pt will have decreased paraspinal mm tension to tolerate sitting >30 minutes for work and home activities   Pt will demonstrate improved core strength to be able to perform jogging 10 min with <3/10 pain   Pt will be independent and compliant with comprehensive HEP to maintain progress achieved in PT      Plan: Continue per plan of care. Plan for next therapy session: focus on hamstring strengthening and hip flexor strengthening and assess tolerance to updated HEP. Date: 9/25/2023  TX#: 2/8 Date:  9/27/2023                TX#: 3/8 Date:  10/2/2023                TX#: 4/8 Date:  10/4/2023                TX#: 5/8 Date: Tx#: 6/ There ex: 25 min   Sidelying red band clam shell 10 reps x 2 sets on the L   Sit to stand 10 reps   Lateral walk red band at ankles 30' x 2 laps R/L   Monster walks 30' x 2 laps red band at ankles- forward retro each   Shuttle DLP level 7 10 reps SLP level 7 10 reps R/L    There ex: 30 min   Upright bike level 2- 5 min   Shuttle DLP level 7 20 reps SLP level 7 10 reps R/L x 2 sets   Hip flexor stretch 30 sec hold x 3 sets   SL bridge on ball 10 sec hold 10 reps   Review of core routine: crunches, oblique rotations, knee ins, push ups, planks, side crunches  Seated piriformis stretch 30 sec hold x 2 sets R/L   Seated sciatic nerve glides 10 reps x 2 sets on the L      There ex: 25 min   Shuttle DLP level 7 20 reps SLP level 7 10 reps R/L x 3 sets   Hip flexor stretch 30 sec hold x 3 sets   HS stretch 30 sec hold x 3 sets R/L   Squats 15 reps - cues for symmetrical weight shift  SL bridge on ball 10 sec hold 10 reps    There ex: 32 min   Shuttle DLP level 7 20 reps SLP level 7 10 reps R/L x 3 sets   Step ups to 12 in box, 2x10 alternating leading R/L   SL RDL 2x10 reps R/L  Standing hamstring stretch 2 x 30 sec R/L  Hip flexor stretch 30 sec hold x 2 sets R/L  SL slider lateral and backwards, 2x10 R/L with TRX bands for support  Pt education of addition of SL RDL into HEP. Cues on what to feel and what not to feel with exercise and proper form. Manual: 10 min   Lateral hip glide gr IV 10 reps x 3 sets on the L   Long axis traction 10 sec hold 10 reps x 3 sets on the L  Manual: 10 min   Lateral hip glide gr IV 10 reps x 3 sets on the L   Long axis traction 10 sec hold 10 reps x 3 sets on the L  Manual: 15 min   Lateral hip glide gr IV 10 reps x 4 sets on the L   Long axis traction 10 sec hold 10 reps x 4 sets on the L  Manual: 10 min   Lateral hip glide gr IV 15 reps on the L   Long axis traction 10 sec hold 15 reps on the left side                  HEP:   Access Code: [email or web address]  URL: [email or web address]  Date: 10/04/2023  Prepared by: Ciara Jain    Exercises  - Supine Piriformis Stretch  - 1 x daily - 7 x weekly - 3 sets - 1 reps - 30 hold  - Side Stepping with Resistance at Ankles  - 1 x daily - 7 x weekly - 2 sets - 10 reps  - Forward Monster Walks  - 1 x daily - 7 x weekly - 2 sets - 10 reps  - Backward Monster Walks  - 1 x daily - 7 x weekly - 2 sets - 10 reps  - Squat with Chair Touch and Resistance Loop  - 1 x daily - 7 x weekly - 2 sets - 10 reps  - Half Kneeling Hip Flexor Stretch  - 1 x daily - 7 x weekly - 3 sets - 1 reps - 30 hold  - Single-Leg [place] Deadlift With Dumbbell  - 1 x daily - 7 x weekly - 2 sets - 10 reps  - Standing Hamstring Stretch on Chair  - 1 x daily - 7 x weekly - 3 sets - 1 reps - 30 hold     Charges: there ex: 2 manual: 1       Total Timed Treatment: 42 min  Total Treatment Time: 42  min

## 2023-10-05 ENCOUNTER — OFFICE VISIT (OUTPATIENT)
Dept: PHYSICAL THERAPY | Age: 63
End: 2023-10-05
Attending: NURSE PRACTITIONER
Payer: COMMERCIAL

## 2023-10-05 DIAGNOSIS — M67.873 ACHILLES TENDINOSIS OF RIGHT ANKLE: Primary | ICD-10-CM

## 2023-10-05 PROCEDURE — 97140 MANUAL THERAPY 1/> REGIONS: CPT

## 2023-10-05 PROCEDURE — 97161 PT EVAL LOW COMPLEX 20 MIN: CPT

## 2023-10-09 ENCOUNTER — APPOINTMENT (OUTPATIENT)
Dept: PHYSICAL THERAPY | Age: 63
End: 2023-10-09
Attending: NURSE PRACTITIONER
Payer: COMMERCIAL

## 2023-10-11 ENCOUNTER — APPOINTMENT (OUTPATIENT)
Dept: PHYSICAL THERAPY | Age: 63
End: 2023-10-11
Attending: NURSE PRACTITIONER
Payer: COMMERCIAL

## 2023-10-12 ENCOUNTER — OFFICE VISIT (OUTPATIENT)
Dept: SURGERY | Facility: CLINIC | Age: 63
End: 2023-10-12
Payer: COMMERCIAL

## 2023-10-12 DIAGNOSIS — N20.0 NEPHROLITHIASIS: ICD-10-CM

## 2023-10-12 DIAGNOSIS — R82.90 URINE FINDING: Primary | ICD-10-CM

## 2023-10-12 LAB
APPEARANCE: CLEAR
BILIRUBIN: NEGATIVE
GLUCOSE (URINE DIPSTICK): NEGATIVE MG/DL
KETONES (URINE DIPSTICK): NEGATIVE MG/DL
LEUKOCYTES: NEGATIVE
MULTISTIX LOT#: NORMAL NUMERIC
NITRITE, URINE: NEGATIVE
OCCULT BLOOD: NEGATIVE
PH, URINE: 5.5 (ref 4.5–8)
PROTEIN (URINE DIPSTICK): NEGATIVE MG/DL
SPECIFIC GRAVITY: 1.02 (ref 1–1.03)
URINE-COLOR: YELLOW
UROBILINOGEN,SEMI-QN: 0.2 MG/DL (ref 0–1.9)

## 2023-10-12 PROCEDURE — 99204 OFFICE O/P NEW MOD 45 MIN: CPT | Performed by: UROLOGY

## 2023-10-12 PROCEDURE — 81003 URINALYSIS AUTO W/O SCOPE: CPT | Performed by: UROLOGY

## 2023-10-12 RX ORDER — UBIDECARENONE 100 MG
CAPSULE ORAL
COMMUNITY

## 2023-10-12 RX ORDER — SODIUM BICARBONATE 650 MG/1
1 TABLET ORAL DAILY
COMMUNITY

## 2023-10-12 RX ORDER — POLYETHYLENE GLYCOL 400 AND PROPYLENE GLYCOL 4; 3 MG/ML; MG/ML
SOLUTION/ DROPS OPHTHALMIC
COMMUNITY

## 2023-10-12 RX ORDER — LEVOTHYROXINE SODIUM 0.05 MG/1
TABLET ORAL
COMMUNITY
Start: 2023-07-07

## 2023-10-12 RX ORDER — TADALAFIL 5 MG/1
TABLET ORAL
COMMUNITY

## 2023-10-12 RX ORDER — METHYLPREDNISOLONE 4 MG/1
TABLET ORAL
COMMUNITY
Start: 2023-08-04

## 2023-10-12 NOTE — PROGRESS NOTES
Urology Clinic Note - New Patient    Referring Provider:  No referring provider defined for this encounter. Primary Care Provider:  Maridee Spatz, MD     Chief Complaint:   Nephrolithiais     HPI:     Becky Ellison is a 61year old male with history of HLD referred for nephrolithiasis. Has followed with Dr. Nesha Milian for many years. Has passed many stones spontaneously. Had URS on left side 2019, stone was CaOx. 24hr urine was low citrate, high oxalate. Most recently had a AGUSTÍN 11/2021 per PCP which showed a left sided 4mm stone. He now presents to establish care. He does well with stone prevention diet; has increased citrate and water in diet. No acute stone episodes since 2019. Denies flank pain, UTI history. Denies gross hematuria, dysuria. No fevers, chills, vomiting. Minimal LUTS, AUA SS is 7. No other urologic concerns. Urinalysis (clinic dip): NEGATIVE    PSA:  Lab Results   Component Value Date    PSA 1.41 11/29/2021    PSA 1.38 06/21/2018    PSAS 1.28 06/24/2019      Last Cr was 0.92 done on 6/24/2019. Last GFR (AMARA) was 91 done on 6/24/2019. History:     Past Medical History:   Diagnosis Date    Calculus of kidney     Extrinsic asthma, unspecified     Hearing loss     High cholesterol     Other and unspecified hyperlipidemia     Wears glasses        Past Surgical History:   Procedure Laterality Date    COLONOSCOPY  7-31-12 [facility]    COLONOSCOPY      ORAL SURGERY PROCEDURE      [place] [facility]    OTHER SURGICAL HISTORY      varicose vein to left testicle    OTHER SURGICAL HISTORY      sinus sx    OTHER SURGICAL HISTORY  03/21/2019    Cysto/ Stent Removal PA.  Thera Matter    TONSILLECTOMY         Family History   Problem Relation Age of Onset    Heart Attack Father     Mental Disorder Mother        Social History     Socioeconomic History    Marital status:    Tobacco Use    Smoking status: Former    Smokeless tobacco: Former   Vaping Use    Vaping Use: Never used   Substance and Sexual Activity    Alcohol use: Yes     Comment: occasionally    Drug use: No       Medications (Active prior to today's visit):  Current Outpatient Medications   Medication Sig Dispense Refill    Cyanocobalamin (VITAMIN B 12) 500 MCG Oral Tab Take 1 tablet by mouth daily. Coenzyme Q10 100 MG Oral Cap 1 capsule with a meal Orally Once a day      levothyroxine 50 MCG Oral Tab 1 tablet in the morning on an empty stomach Orally Once a day for 90 days      methylPREDNISolone 4 MG Oral Tablet Therapy Pack       Multiple Vitamin (MULTI VITAMIN) Oral Tab Take 1 tablet by mouth daily. polypropylene glycol- (SYSTANE) 0.4-0.3 % Ophthalmic Solution as directed Ophthalmic      tadalafil 5 MG Oral Tab take 1 tablet by mouth daily as needed 1 hour prior to intercourse as directed Orally as directed for 90 days      DULoxetine 20 MG Oral Cap DR Particles Take 1 capsule (20 mg total) by mouth 2 (two) times daily. 60 capsule 0    Meloxicam 15 MG Oral Tab Take 1 tablet (15 mg total) by mouth daily. 30 tablet 0    doxycycline 100 MG Oral Cap Take 1 capsule (100 mg total) by mouth daily. 90 capsule 1    neomycin-polymyxin-dexamethasone 3.5-12823-1.1 Ophthalmic Ointment Place 1 Application. into both eyes 3 (three) times daily. 3.5 g 1    simvastatin 20 MG Oral Tab Take 1 tablet (20 mg total) by mouth every evening. cyclobenzaprine 5 MG Oral Tab       hydrocortisone 2.5 % External Ointment Apply 1 Application topically 2 (two) times daily. 20 g 3    loratadine 10 MG Oral Tab Take 1 tablet (10 mg total) by mouth daily. Allergies:    Sulfamethoxazole W/*    TONGUE SWELLING      Review of Systems:   A comprehensive 10-point review of systems was completed. Pertinent positives and negatives are noted in the the HPI.     Physical Exam:   CONSTITUTIONAL: Well developed, well nourished, in no acute distress  NEUROLOGIC: Alert and oriented  HEAD: Normocephalic, atraumatic  ENT: Hearing intact   RESPIRATORY: Normal respiratory effort  SKIN: No evident rashes  ABDOMEN: Soft, non-tender, non-distended,   GENITOURINARY: Normal phallus, orthotopic meatus, normal bilateral testicles        XR FOOT WEIGHTBEARING (3 VIEWS), RIGHT   (CPT=73630)    Result Date: 9/20/2023  CONCLUSION:  1. Moderate enthesophyte formation plantar surface of calcaneus. 2. 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 [facility]   Dictated by (CST): Marcelle Benitez MD on 9/20/2023 at 11:19 AM     Finalized by (CST): Marcelle Benitez MD on 9/20/2023 at 11:19 AM          Previous CT scan and US reviewed       Assessment & Plan:     Albert Mauro is a 61year old male with history of HLD referred for nephrolithiasis. # Nephrolithiasis  - No recent imaging since 2021- will order CT stone to evaluate current stone burden  - Patient very motivated to avoid acute stone episode; will recheck his 24 hr urine and metabolic workup; proceed with medical management if indicated    Discussed reasons to present to ED. I discussed general fluid and dietary guidelines to help prevent further stone formation including:    - Fluid consumption of preferably water to make 2-2.5  L/day of urine  - Low sodium consumption  - Adequate calcium consumption (approximately 1200 mg/day)  - Low fat and moderate animal protein consumption  - Limit consumption  of oxalate rich foods. A list of oxalate rich foods was provided             - I encouraged increased dietary intake of citrate with lemon juice (4 oz day)        Return in 1-2 mo to discuss results of above     Thank you for this consult. I have personally reviewed all relevant medical records, labs, and imaging.              Renetta Graf MD  Staff Urologist  [facility]  Office: [phone]

## 2023-10-15 PROCEDURE — 84560 ASSAY OF URINE/URIC ACID: CPT

## 2023-10-15 PROCEDURE — 84392 ASSAY OF URINE SULFATE: CPT

## 2023-10-15 PROCEDURE — 84133 ASSAY OF URINE POTASSIUM: CPT

## 2023-10-15 PROCEDURE — 82436 ASSAY OF URINE CHLORIDE: CPT

## 2023-10-15 PROCEDURE — 83986 ASSAY PH BODY FLUID NOS: CPT

## 2023-10-15 PROCEDURE — 83945 ASSAY OF OXALATE: CPT

## 2023-10-15 PROCEDURE — 84105 ASSAY OF URINE PHOSPHORUS: CPT

## 2023-10-15 PROCEDURE — 84300 ASSAY OF URINE SODIUM: CPT

## 2023-10-15 PROCEDURE — 82340 ASSAY OF CALCIUM IN URINE: CPT

## 2023-10-15 PROCEDURE — 82507 ASSAY OF CITRATE: CPT

## 2023-10-15 PROCEDURE — 83735 ASSAY OF MAGNESIUM: CPT

## 2023-10-16 ENCOUNTER — LAB ENCOUNTER (OUTPATIENT)
Dept: LAB | Age: 63
End: 2023-10-16
Attending: UROLOGY
Payer: COMMERCIAL

## 2023-10-16 ENCOUNTER — OFFICE VISIT (OUTPATIENT)
Dept: PHYSICAL THERAPY | Age: 63
End: 2023-10-16
Attending: NURSE PRACTITIONER
Payer: COMMERCIAL

## 2023-10-16 DIAGNOSIS — N20.0 NEPHROLITHIASIS: ICD-10-CM

## 2023-10-16 PROCEDURE — 97140 MANUAL THERAPY 1/> REGIONS: CPT

## 2023-10-16 PROCEDURE — 97110 THERAPEUTIC EXERCISES: CPT

## 2023-10-16 NOTE — PROGRESS NOTES
Diagnosis:   Chronic left-sided low back pain with left-sided sciatica (M54.42,G89.29)  Absent left patellar reflex (R29.2)         Referring Provider: Constantino Clarke  Date of Evaluation:     9/19/2023     Precautions:  None Next MD visit:   none scheduled  Date of Surgery: n/a   Insurance Primary/Secondary: [facility] HMO / N/A     # Auth Visits: HMO- 60 visit limit            Subjective: Pt is having less pain in the L hamstring area, driving long distances and sitting for a long time still irritates near insertion of hamstrings. Still has irritation with the R Achilles and some swelling and pain with push off activities. Reports pain with jogging with the dog in both the Achilles and L hamstrings. After 20-25 min of the walk started to feel L buttock pain. Chief complaint: little bit of weakness with stairs, prolonged sitting, increased pain in R Achilles   Pain: 0/10    Objective:   JOHN: no pain reproduction bilaterally    Assessment: Pt demonstrates improving activity tolerance in therapy sessions with increased strengthening exercises. Pt continues to have weakness in the LLE especially in hip flexors and hamstrings. Patient would benefit from continued skilled therapy to improve strength in LLE and return to prior level of function.       Goals: (to be met in 8 visits) Progressing toward goals 10/16/2023    Pt will improve transversus abdominis recruitment to perform proper isometric contraction without requiring verbal or tactile cuing to promote advancement of therex  Pt will report improved symptom centralization and absence of radicular symptoms for 3 consecutive days to improve function with ADL   Pt will have decreased paraspinal mm tension to tolerate sitting >30 minutes for work and home activities   Pt will demonstrate improved core strength to be able to perform jogging 10 min with <3/10 pain   Pt will be independent and compliant with comprehensive HEP to maintain progress achieved in PT      Plan: Continue per plan of care. Plan for next therapy session: continue to progress hamstring strengthening with open chain/SL activities  Date: 9/25/2023  TX#: 2/8 Date:  9/27/2023                TX#: 3/8 Date:  10/2/2023                TX#: 4/8 Date:  10/4/2023                TX#: 5/8 Date: 10/16/2023  Tx#: 6/8   There ex: 25 min   Sidelying red band clam shell 10 reps x 2 sets on the L   Sit to stand 10 reps   Lateral walk red band at ankles 30' x 2 laps R/L   Monster walks 30' x 2 laps red band at ankles- forward retro each   Shuttle DLP level 7 10 reps SLP level 7 10 reps R/L    There ex: 30 min   Upright bike level 2- 5 min   Shuttle DLP level 7 20 reps SLP level 7 10 reps R/L x 2 sets   Hip flexor stretch 30 sec hold x 3 sets   SL bridge on ball 10 sec hold 10 reps   Review of core routine: crunches, oblique rotations, knee ins, push ups, planks, side crunches  Seated piriformis stretch 30 sec hold x 2 sets R/L   Seated sciatic nerve glides 10 reps x 2 sets on the L      There ex: 25 min   Shuttle DLP level 7 20 reps SLP level 7 10 reps R/L x 3 sets   Hip flexor stretch 30 sec hold x 3 sets   HS stretch 30 sec hold x 3 sets R/L   Squats 15 reps - cues for symmetrical weight shift  SL bridge on ball 10 sec hold 10 reps    There ex: 32 min   Shuttle DLP level 7 20 reps SLP level 7 10 reps R/L x 3 sets   Step ups to 12 in box, 2x10 alternating leading R/L   SL RDL 2x10 reps R/L  Standing hamstring stretch 2 x 30 sec R/L  Hip flexor stretch 30 sec hold x 2 sets R/L  SL slider lateral and backwards, 2x10 R/L with TRX bands for support  Pt education of addition of SL RDL into HEP. Cues on what to feel and what not to feel with exercise and proper form.    There ex: 35 min   Straight leg bridges on ball 2x10  Bridge on ball with hamstring curl x10  Shuttle DLP level 7 20 reps, SLP level 7 20 reps L   Shuttle SL hopping level 5 2x10  SL RDL with 10 lb kettle bell, 2x10 reps R/L, cues to keep chest up   SL slider lateral and backwards, 2x10 R/L with TRX bands for support  Supine piriformis stretch 30 sec holds x 2 R/L  Hussein stretch: 2x30 sec R/L  Pt education: updated HEP, form with activities, timing. Education on plan of care going forward. Manual: 10 min   Lateral hip glide gr IV 10 reps x 3 sets on the L   Long axis traction 10 sec hold 10 reps x 3 sets on the L  Manual: 10 min   Lateral hip glide gr IV 10 reps x 3 sets on the L   Long axis traction 10 sec hold 10 reps x 3 sets on the L  Manual: 15 min   Lateral hip glide gr IV 10 reps x 4 sets on the L   Long axis traction 10 sec hold 10 reps x 4 sets on the L  Manual: 10 min   Lateral hip glide gr IV 15 reps on the L   Long axis traction 10 sec hold 15 reps on the left side Manual: 10 min   Lateral hip glide gr IV 15 reps on the L   Long axis traction 10 sec hold 15 reps on the left side                 HEP:   Access Code: [email or web address]  URL: [email or web address]  Date: 10/16/2023  Prepared by: Lillian Done    Exercises  - Supine Piriformis Stretch  - 1 x daily - 7 x weekly - 3 sets - 1 reps - 30 hold  - Side Stepping with Resistance at Ankles  - 1 x daily - 7 x weekly - 2 sets - 10 reps  - Forward Monster Walks  - 1 x daily - 7 x weekly - 2 sets - 10 reps  - Backward Monster Walks  - 1 x daily - 7 x weekly - 2 sets - 10 reps  - Squat with Chair Touch and Resistance Loop  - 1 x daily - 7 x weekly - 2 sets - 10 reps  - Half Kneeling Hip Flexor Stretch  - 1 x daily - 7 x weekly - 3 sets - 1 reps - 30 hold  - Standing Hamstring Stretch on Chair  - 1 x daily - 7 x weekly - 3 sets - 1 reps - 30 hold  - Supine Hamstring Curl on Swiss Ball  - 1 x daily - 7 x weekly - 2 sets - 10 reps     Charges: there ex: 2 manual: 1       Total Timed Treatment: 45 min  Total Treatment Time: 45 min

## 2023-10-19 ENCOUNTER — HOSPITAL ENCOUNTER (OUTPATIENT)
Dept: CT IMAGING | Facility: HOSPITAL | Age: 63
Discharge: HOME | End: 2023-10-19
Attending: UROLOGY
Payer: COMMERCIAL

## 2023-10-19 DIAGNOSIS — N20.0 NEPHROLITHIASIS: ICD-10-CM

## 2023-10-19 PROCEDURE — 74176 CT ABD & PELVIS W/O CONTRAST: CPT | Performed by: UROLOGY

## 2023-10-23 ENCOUNTER — OFFICE VISIT (OUTPATIENT)
Dept: PHYSICAL THERAPY | Age: 63
End: 2023-10-23
Attending: NURSE PRACTITIONER
Payer: COMMERCIAL

## 2023-10-23 ENCOUNTER — TELEPHONE (OUTPATIENT)
Dept: PHYSICAL THERAPY | Facility: HOSPITAL | Age: 63
End: 2023-10-23

## 2023-10-23 LAB
AMMONIA, URINE: 28 MEQ/24 HR
AMMONIA, URINE: ABNORMAL UG/DL
BRUSHITE: 0.19 RATIO
CHLORIDE URINE: 94 MMOL/24 HR
CHLORIDE, URINE: 32 MMOL/L
CITRIC ACID (CITRATE): 99 MG/L
CITRIC ACID(CITRATE): 292 MG/24 HR
CREATININE, URINE: 1247.9 MG/24 HR
CREATININE, URINE: 42.3 MG/DL
CYSTINE, QUANT, UR: 8.11 MG/24 HR
CYSTINE, QUANTITATIVE, URINE: 2.75 MG/L
LC CALCIUM OXALATE: 1.8 RATIO
LC CALCIUM, URINE: 123.9 MG/24 HR
LC CALCIUM, URINE: 4.2 MG/DL
MAGNESIUM, UR(24 HR): 44 MG/24 HR
MAGNESIUM, URINE: 1.5 MG/DL
MONOSODIUM URATE: 0.45 RATIO
OSMOLALITY, URINE: 226 MOSMOL/KG
OXALATES, URINE: 21 MG/24 HR
OXALATES, URINE: 7 MG/L
PH, 24 HR URINE: 5.9
PHOSPHORUS, URINE: 16.1 MG/DL
PHOSPHORUS, URINE: 475 MG/24 HR
POTASSIUM, URINE: 19.4 MMOL/L
POTASSIUM, URINE: 57.2 MMOL/24 HR
SODIUM, URINE: 100 MMOL/24 HR
SODIUM, URINE: 34 MMOL/L
STRUVITE: 0.01 RATIO
SULFATE, URINE: 15 MEQ/24 HR
SULFATE, URINE: 5 MEQ/L
URIC ACID, URINE: 13.4 MG/DL
URIC ACID, URINE: 395 MG/24 HR
URIC ACID: 0.66 RATIO
URINE VOLUME (PRESERVATIVE): 2950 ML/24 HR
URINE VOLUME: 2950 ML/24 HR

## 2023-10-23 PROCEDURE — 97110 THERAPEUTIC EXERCISES: CPT

## 2023-10-23 NOTE — PROGRESS NOTES
Diagnosis:   Chronic left-sided low back pain with left-sided sciatica (M54.42,G89.29)  Absent left patellar reflex (R29.2)         Referring Provider: Mindi Nelson  Date of Evaluation:     9/19/2023     Precautions:  None Next MD visit:   none scheduled  Date of Surgery: n/a   Insurance Primary/Secondary: [address] [place] [address] HMO / N/A     # Auth Visits: HMO- 60 visit limit            DischargeSummary  Pt has attended 7 visits in Physical Therapy. Subjective: Pt reports he was feeling sore after last visit with some of the new exercises done in last therapy session, but reports as a good muscle soreness, not pain. Still has some of the pain in the left buttock. Moving the lawn was irritating as well. Notices issues when sitting for long periods and is relieved by walking around. Pain: 0.5/10, 1/10 at worst    Objective:   Strength: R/L grossly 5/5 with all myotomes   JOHN: no pain reproduction bilaterally    Assessment: Pt has made gains in strength, ROM and pain reduction since starting physical therapy. Pt no longer has radicular symptoms into the leg and has improved LLE strength grossly to be 5/5. Pt has reached all goals and is able to monitor symptoms at home and adjust activity accordingly. Pt is ready to discharge home independent with exercise program, pt agreeable to plan and will call with any questions or concerns.      Goals: (to be met in 8 visits) Goals met 10/23/2023   Pt will improve transversus abdominis recruitment to perform proper isometric contraction without requiring verbal or tactile cuing to promote advancement of therex MET  Pt will report improved symptom centralization and absence of radicular symptoms for 3 consecutive days to improve function with ADL MET  Pt will have decreased paraspinal mm tension to tolerate sitting >30 minutes for work and home activities MET  Pt will demonstrate improved core strength to be able to perform jogging 10 min with <3/10 pain MET  Pt will be independent and compliant with comprehensive HEP to maintain progress achieved in PT  MET    Post LEFS Score  Post LEFS Score: 88.75 % (10/23/2023  6:53 PM)    88.75 % improvement    Plan: Pt to discharge home with independent HEP to focus on hip strengthening and stretching. Pt will call with any questions or concerns following discharge from therapy. Pt to begin therapy to focus on Achilles pain in the upcoming weeks. Patient/Family/Caregiver was advised of these findings, precautions, and treatment options and has agreed to actively participate in planning and for this course of care. Thank you for your referral. If you have any questions, please contact me at Dept: [phone].     Sincerely,  Electronically signed by therapist: Cyn File       Certification From: 47/25/8181  To:1/21/2024    Date: 9/25/2023  TX#: 2/8 Date:  9/27/2023                TX#: 3/8 Date:  10/2/2023                TX#: 4/8 Date:  10/4/2023                TX#: 5/8 Date: 10/16/2023  Tx#: 6/8 10/23/2023   Tx# 7/8   There ex: 25 min   Sidelying red band clam shell 10 reps x 2 sets on the L   Sit to stand 10 reps   Lateral walk red band at ankles 30' x 2 laps R/L   Monster walks 30' x 2 laps red band at ankles- forward retro each   Shuttle DLP level 7 10 reps SLP level 7 10 reps R/L    There ex: 30 min   Upright bike level 2- 5 min   Shuttle DLP level 7 20 reps SLP level 7 10 reps R/L x 2 sets   Hip flexor stretch 30 sec hold x 3 sets   SL bridge on ball 10 sec hold 10 reps   Review of core routine: crunches, oblique rotations, knee ins, push ups, planks, side crunches  Seated piriformis stretch 30 sec hold x 2 sets R/L   Seated sciatic nerve glides 10 reps x 2 sets on the L      There ex: 25 min   Shuttle DLP level 7 20 reps SLP level 7 10 reps R/L x 3 sets   Hip flexor stretch 30 sec hold x 3 sets   HS stretch 30 sec hold x 3 sets R/L   Squats 15 reps - cues for symmetrical weight shift  SL bridge on ball 10 sec hold 10 reps    There ex: 32 min Shuttle DLP level 7 20 reps SLP level 7 10 reps R/L x 3 sets   Step ups to 12 in box, 2x10 alternating leading R/L   SL RDL 2x10 reps R/L  Standing hamstring stretch 2 x 30 sec R/L  Hip flexor stretch 30 sec hold x 2 sets R/L  SL slider lateral and backwards, 2x10 R/L with TRX bands for support  Pt education of addition of SL RDL into HEP. Cues on what to feel and what not to feel with exercise and proper form. There ex: 35 min   Straight leg bridges on ball 2x10  Bridge on ball with hamstring curl x10  Shuttle DLP level 7 20 reps, SLP level 7 20 reps L   Shuttle SL hopping level 5 2x10  SL RDL with 10 lb kettle bell, 2x10 reps R/L, cues to keep chest up   SL slider lateral and backwards, 2x10 R/L with TRX bands for support  Supine piriformis stretch 30 sec holds x 2 R/L  Hussein stretch: 2x30 sec R/L  Pt education: updated HEP, form with activities, timing. Education on plan of care going forward.   There ex: 35 min   Straight leg bridges on ball 2x10  Bridge on ball with hamstring curl x10  Shuttle SLP R/L, level 6, 20 reps  Shuttle SL hopping level 5 2x10  Review of HEP: one set of each, lateral walks, monster walks, backwards monster walks with green band  SL slider lateral and backwards, 2x10 R/L   Strength testing in objective   Manual: 10 min   Lateral hip glide gr IV 10 reps x 3 sets on the L   Long axis traction 10 sec hold 10 reps x 3 sets on the L  Manual: 10 min   Lateral hip glide gr IV 10 reps x 3 sets on the L   Long axis traction 10 sec hold 10 reps x 3 sets on the L  Manual: 15 min   Lateral hip glide gr IV 10 reps x 4 sets on the L   Long axis traction 10 sec hold 10 reps x 4 sets on the L  Manual: 10 min   Lateral hip glide gr IV 15 reps on the L   Long axis traction 10 sec hold 15 reps on the left side Manual: 10 min   Lateral hip glide gr IV 15 reps on the L   Long axis traction 10 sec hold 15 reps on the left side                    HEP:   Access Code: [email or web address]  URL: [email or web address]  Date: 10/23/2023  Prepared by: Pelon Goyal    Exercises  - Side Stepping with Resistance at Ankles  - 1 x daily - 7 x weekly - 2 sets - 10 reps  - Supine Bridge with Heels on Swiss Ball and Knees Bent  - 1 x daily - 7 x weekly - 2 sets - 10 reps  - [place] Pose  - 1 x daily - 7 x weekly - 3 sets - 1 reps - 30 hold  - Forward Monster Walks  - 1 x daily - 7 x weekly - 2 sets - 10 reps  - Backward Monster Walks  - 1 x daily - 7 x weekly - 2 sets - 10 reps     Charges: there ex:   2     Total Timed Treatment: 35 min  Total Treatment Time: 35 min

## 2023-10-24 ENCOUNTER — APPOINTMENT (OUTPATIENT)
Dept: PHYSICAL THERAPY | Age: 63
End: 2023-10-24
Attending: NURSE PRACTITIONER
Payer: COMMERCIAL

## 2023-10-25 ENCOUNTER — OFFICE VISIT (OUTPATIENT)
Dept: PHYSICAL THERAPY | Age: 63
End: 2023-10-25
Attending: NURSE PRACTITIONER
Payer: COMMERCIAL

## 2023-10-25 PROCEDURE — 97110 THERAPEUTIC EXERCISES: CPT

## 2023-10-25 NOTE — PROGRESS NOTES
Diagnosis:       Achilles tendinosis of right ankle (Y36.890)        Referring Provider: Philippe Bunn  Date of Evaluation:    10/5/23    Precautions:  None Next MD visit:   none scheduled  Date of Surgery: n/a   Insurance Primary/Secondary: [address] [place] [address] / N/A     # Auth Visits: 8            Subjective: 1/10 today in the R achilles region. Still wearing his heel lift     Pain: 1/10      Objective:  DF R ankle 20 degs  Tender R achilles with palpation and tooling       Assessment:  pt reports 0/10 post visit today- taped with KT today to help with the inflammation. No pain or complaints with exercises today       Goals:   Pt will report picking up pace walking and not having R achilles pain  Pt will demonstrate full ROM on the R ankle without any pain in the achilles for ADLs  Full heel strike on the R with ambulation and will negotiate uneven surfaces without any discomfort. Pt will demonstrate improved B hip mobility- he is seeing Jennifer Alvarenga PT for low back pain and radiculopathy on the R- working on this with her as well. I with HEP  Can return to running without any pain on the R achilles     Plan: cont with PT . Assess effects of the tape next appt   Date: 10/25/2023  TX#: 2/8 Date:                 TX#: 3/ Date:                 TX#: 4/ Date:                 TX#: 5/ Date:    Tx#: 6/   Rocker board 10 x 2  B   SL R rocker board   Balance only   Toe raises  on foam 10 x 2     Cupping R gastroc  Toll achilles 10 mins   Ankle PROM stretches   Reformer 4 cords 10 x 2     Taped with KT today today                             HEP: see above     Charges: EX 3       Total Timed Treatment: 45 min  Total Treatment Time: 45 min

## 2023-10-27 ENCOUNTER — APPOINTMENT (OUTPATIENT)
Dept: PHYSICAL THERAPY | Age: 63
End: 2023-10-27
Attending: NURSE PRACTITIONER
Payer: COMMERCIAL

## 2023-11-01 ENCOUNTER — OFFICE VISIT (OUTPATIENT)
Dept: PHYSICAL THERAPY | Age: 63
End: 2023-11-01
Attending: NURSE PRACTITIONER
Payer: COMMERCIAL

## 2023-11-01 PROCEDURE — 97110 THERAPEUTIC EXERCISES: CPT

## 2023-11-01 NOTE — PROGRESS NOTES
Diagnosis:       Achilles tendinosis of right ankle (C50.531)        Referring Provider: Teodoro Wei  Date of Evaluation:    10/5/23    Precautions:  None Next MD visit:   none scheduled  Date of Surgery: n/a   Insurance Primary/Secondary: [address] [place], [place] [address]O / N/A     # Auth Visits: 8            Subjective: 1/10 today in the R achilles region. Still wearing his heel lift     Pain: 1/10      Objective:  DF R ankle 20 degs  Tender R achilles with palpation and tooling       Assessment:  pt reports 0/10 post visit today- taped with KT today to help with the inflammation. No pain or complaints with exercises today     No numbness or tingling into the L LE, however continued pin point pain in the HS insertion on th L- could reproduce with HS isometrics today - eccentrically. Tender R achilles with tool. no pain post visit R achilles      Goals:   Pt will report picking up pace walking and not having R achilles pain  Pt will demonstrate full ROM on the R ankle without any pain in the achilles for ADLs  Full heel strike on the R with ambulation and will negotiate uneven surfaces without any discomfort. Pt will demonstrate improved B hip mobility- he is seeing Denia Evans PT for low back pain and radiculopathy on the R- working on this with her as well. I with HEP  Can return to running without any pain on the R achilles     Plan: cont with PT . Assess effects of the tape next appt   Date: 10/25/2023  TX#: 2/8 Date:                 TX#: 3/ Date:                 TX#: 4/ Date:                 TX#: 5/ Date:    Tx#: 6/   Rocker board 10 x 2  B   SL R rocker board   Balance only   Toe raises  on foam 10 x 2     Cupping R gastroc  Toll achilles 10 mins   Ankle PROM stretches   Reformer 4 cords 10 x 2     Taped with KT today today  Nustep 2 mins feet flat   One up on toes  Step ups on bosu 10 x   Rocker board A/P 10 x   B and R only   Baps LV 2 10 x R only A/P   M/L  STM tool R achilles  HS MFR L 3 mins   IR/ER hip passive stretch 10 x each HEP: see above     Charges: EX 3       Total Timed Treatment: 45 min  Total Treatment Time: 45 min

## 2023-11-08 ENCOUNTER — APPOINTMENT (OUTPATIENT)
Dept: PHYSICAL THERAPY | Age: 63
End: 2023-11-08
Attending: PODIATRIST
Payer: COMMERCIAL

## 2023-11-10 ENCOUNTER — OFFICE VISIT (OUTPATIENT)
Dept: PHYSICAL THERAPY | Age: 63
End: 2023-11-10
Attending: PODIATRIST
Payer: COMMERCIAL

## 2023-11-10 PROCEDURE — 97110 THERAPEUTIC EXERCISES: CPT

## 2023-11-10 NOTE — PROGRESS NOTES
Diagnosis:       Achilles tendinosis of right ankle (T81.745)        Referring Provider: Scar Bernstein  Date of Evaluation:    10/5/23    Precautions:  None Next MD visit:   none scheduled  Date of Surgery: n/a   Insurance Primary/Secondary: [employer] [place] HMO / N/A     # Auth Visits: 8            Subjective:   R 1/0   L  HS attachment   Pain: 1/10      Objective:  DF R ankle 20 degs  Tender R achilles with palpation and tooling       Assessment:   better post visit. Could feel the area on the L HS with SL bridge, fatigued quickly with SL bridge L vs R. Pt did have some increased tenderness R achilles medial border. Gastroc cupping today to help with tightness. Goals:   Pt will report picking up pace walking and not having R achilles pain  Pt will demonstrate full ROM on the R ankle without any pain in the achilles for ADLs  Full heel strike on the R with ambulation and will negotiate uneven surfaces without any discomfort. Pt will demonstrate improved B hip mobility- he is seeing Jhon Casarez PT for low back pain and radiculopathy on the R- working on this with her as well. I with HEP  Can return to running without any pain on the R achilles     Plan: cont with PT . Assess effects of the tape next appt   Date: 10/25/2023  TX#: 2/8 Date:                 TX#: 3/ Date:   11/10/23            TX#: 4/8 Date:                 TX#: 5/ Date:    Tx#: 6/   Rocker board 10 x 2  B   SL R rocker board   Balance only   Toe raises  on foam 10 x 2     Cupping R gastroc  Toll achilles 10 mins   Ankle PROM stretches   Reformer 4 cords 10 x 2     Taped with KT today today  Nustep 2 mins feet flat   One up on toes  Step ups on bosu 10 x   Rocker board A/P 10 x   B and R only   Baps LV 2 10 x R only A/P   M/L  STM tool R achilles  HS MFR L 3 mins   IR/ER hip passive stretch 10 x each  Nustep 2 mins feet flat one min toes only   Step ups bosu 10 x each     Reformer 7 cords squats /PF 10 x 2   SL bridge 10 x on plinth   Gastroc stretch 3 x 30 secs    STM George fall   15 mins MT                           HEP: see above     Charges: EX 3       Total Timed Treatment: 45 min  Total Treatment Time: 45 min

## 2023-11-15 ENCOUNTER — OFFICE VISIT (OUTPATIENT)
Dept: PHYSICAL THERAPY | Age: 63
End: 2023-11-15
Attending: PODIATRIST
Payer: COMMERCIAL

## 2023-11-15 PROCEDURE — 97110 THERAPEUTIC EXERCISES: CPT

## 2023-11-15 NOTE — PROGRESS NOTES
Diagnosis:       Achilles tendinosis of right ankle (V87.657)        Referring Provider: Elio Murrieta  Date of Evaluation:    10/5/23    Precautions:  None Next MD visit:   none scheduled  Date of Surgery: n/a   Insurance Primary/Secondary: [address] [place], [place] [address]O / N/A     # Auth Visits: 8            Subjective:    R achilles 2/10 very tender last night, [place] go [place] the city [place], did not wear shoes with great arch support. Today 1.10  Took maloxicam   last night, did not ice. Pain: 1/10      Objective:  DF R ankle 20 degs  Tender R achilles with palpation and tooling       Assessment:    pt reports few mins of the R LE numb diffuse in nature. Less edema today, however working from home. Did attempt to use the game ready today for edema on the R achilles. Could squat down with less pain on the L HS attachment site. Goals:   Pt will report picking up pace walking and not having R achilles pain  Pt will demonstrate full ROM on the R ankle without any pain in the achilles for ADLs  Full heel strike on the R with ambulation and will negotiate uneven surfaces without any discomfort. Pt will demonstrate improved B hip mobility- he is seeing Denia Evans PT for low back pain and radiculopathy on the R- working on this with her as well. I with HEP  Can return to running without any pain on the R achilles     Plan: cont with PT . Assess effects of the game ready next appt. Pt to cont with icing at home  Date: 10/25/2023  TX#: 2/8 Date:                 TX#: 3/ Date:   11/10/23            TX#: 4/8 Date:   11/15/23              TX#: 5/8 Date:    Tx#: 6/   Rocker board 10 x 2  B   SL R rocker board   Balance only   Toe raises  on foam 10 x 2     Cupping R gastroc  Toll achilles 10 mins   Ankle PROM stretches   Reformer 4 cords 10 x 2     Taped with KT today today  Nustep 2 mins feet flat   One up on toes  Step ups on bosu 10 x   Rocker board A/P 10 x   B and R only   Baps LV 2 10 x R only A/P   M/L  STM tool R achilles  HS MFR L 3 mins IR/ER hip passive stretch 10 x each  Nustep 2 mins feet flat one min toes only   Step ups bosu 10 x each     Reformer 7 cords squats /PF 10 x 2   SL bridge 10 x on plinth   Gastroc stretch 3 x 30 secs    STM /tool R achilles   15 mins MT  Nustep 5 mins LE only   Reformer squats and PF 6 cords  Tool L HS attachment   R achilles 10 mins     Gastroc stretch 3 x 3 secs B   Game ready 10 mins 40 degs low compression R                          HEP: see above     Charges: EX 3       Total Timed Treatment: 45 min  Total Treatment Time: 45 min

## 2023-11-17 ENCOUNTER — TELEPHONE (OUTPATIENT)
Dept: SURGERY | Facility: CLINIC | Age: 63
End: 2023-11-17

## 2023-11-17 ENCOUNTER — OFFICE VISIT (OUTPATIENT)
Dept: SURGERY | Facility: CLINIC | Age: 63
End: 2023-11-17

## 2023-11-17 ENCOUNTER — LAB ENCOUNTER (OUTPATIENT)
Dept: LAB | Age: 63
End: 2023-11-17
Attending: UROLOGY
Payer: COMMERCIAL

## 2023-11-17 DIAGNOSIS — N20.0 KIDNEY STONES: ICD-10-CM

## 2023-11-17 DIAGNOSIS — N20.0 KIDNEY STONE: Primary | ICD-10-CM

## 2023-11-17 DIAGNOSIS — N20.0 NEPHROLITHIASIS: ICD-10-CM

## 2023-11-17 DIAGNOSIS — R82.90 URINE FINDING: Primary | ICD-10-CM

## 2023-11-17 LAB
ANION GAP SERPL CALC-SCNC: 2 MMOL/L (ref 0–18)
APPEARANCE: CLEAR
BILIRUBIN: NEGATIVE
BUN BLD-MCNC: 11 MG/DL (ref 9–23)
CALCIUM BLD-MCNC: 9.2 MG/DL (ref 8.5–10.1)
CHLORIDE SERPL-SCNC: 110 MMOL/L (ref 98–112)
CO2 SERPL-SCNC: 29 MMOL/L (ref 21–32)
CREAT BLD-MCNC: 0.94 MG/DL
EGFRCR SERPLBLD CKD-EPI 2021: 91 ML/MIN/1.73M2 (ref 60–?)
FASTING STATUS PATIENT QL REPORTED: NO
GLUCOSE (URINE DIPSTICK): NEGATIVE MG/DL
GLUCOSE BLD-MCNC: 76 MG/DL (ref 70–99)
KETONES (URINE DIPSTICK): NEGATIVE MG/DL
LEUKOCYTES: NEGATIVE
MULTISTIX LOT#: NORMAL NUMERIC
NITRITE, URINE: NEGATIVE
OCCULT BLOOD: NEGATIVE
OSMOLALITY SERPL CALC.SUM OF ELEC: 290 MOSM/KG (ref 275–295)
PH, URINE: 7.5 (ref 4.5–8)
POTASSIUM SERPL-SCNC: 4.1 MMOL/L (ref 3.5–5.1)
PROTEIN (URINE DIPSTICK): NEGATIVE MG/DL
PTH-INTACT SERPL-MCNC: 41.1 PG/ML (ref 18.5–88)
SODIUM SERPL-SCNC: 141 MMOL/L (ref 136–145)
SPECIFIC GRAVITY: 1.01 (ref 1–1.03)
URATE SERPL-MCNC: 4.9 MG/DL
URINE-COLOR: YELLOW
UROBILINOGEN,SEMI-QN: 0.2 MG/DL (ref 0–1.9)

## 2023-11-17 PROCEDURE — 80048 BASIC METABOLIC PNL TOTAL CA: CPT

## 2023-11-17 PROCEDURE — 99214 OFFICE O/P EST MOD 30 MIN: CPT | Performed by: UROLOGY

## 2023-11-17 PROCEDURE — 81002 URINALYSIS NONAUTO W/O SCOPE: CPT | Performed by: UROLOGY

## 2023-11-17 PROCEDURE — 83970 ASSAY OF PARATHORMONE: CPT

## 2023-11-17 PROCEDURE — 36415 COLL VENOUS BLD VENIPUNCTURE: CPT

## 2023-11-17 PROCEDURE — 84550 ASSAY OF BLOOD/URIC ACID: CPT

## 2023-11-17 RX ORDER — CLOTRIMAZOLE AND BETAMETHASONE DIPROPIONATE 10; .64 MG/G; MG/G
1 CREAM TOPICAL 2 TIMES DAILY
Qty: 45 G | Refills: 1 | Status: SHIPPED | OUTPATIENT
Start: 2023-11-17

## 2023-11-17 RX ORDER — POTASSIUM CITRATE 15 MEQ/1
1 TABLET, EXTENDED RELEASE ORAL 2 TIMES DAILY
Qty: 90 TABLET | Refills: 5 | Status: SHIPPED | OUTPATIENT
Start: 2023-11-17

## 2023-11-17 NOTE — TELEPHONE ENCOUNTER
RN called Cynthia Conti to pharmacist. Katherin Boswell to dispense 180 tabs for 90 day supply. Order carried out       Medication Detail      Medication Quantity Refills Start End   Potassium Citrate ER (UROCIT-K 15) 15 MEQ (1620 MG) Oral Tab CR 90 tablet 5 11/17/2023    Sig:   Take 1 tablet by mouth in the morning and 1 tablet before bedtime.

## 2023-11-17 NOTE — TELEPHONE ENCOUNTER
"Physical Therapy Virtual Initial Visit      The patient has been notified of following:     \"This virtual visit will be conducted between you and your provider. We have found that certain health care needs can be provided without the need for physical presence.  This service lets us provide the care you need with a virtual visit.\"    Due to external, as well as internal [facility] management of the COVID-19 Virus, Teresa Workman was not seen in our clinic.  As a substitution, we implemented a virtual visit to manage this patient's condition utilizing the [employer] virtual visit platform via the patient s existing code.  The provider, Isabella Taylor, reviewed the patient's chart, PTRx prescription, and spoke with the patient to determine the following telemedicine visit is appropriate and effective for the patient's care.    The following type of visit was completed:   Video Visit:  The [employer] platform uses a synchronous HIPAA compliant video stream for this patient encounter.         Subjective:  The history is provided by the patient.   Patient Health History  Teresa Workman being seen for left knee pain.     Date of Onset: MD order 4/23/20, onset months ago, recently worse Spring 2020.   Problem occurred: unknown, overuse, biking   Pain score: 5-6/10 at worst.  General health as reported by patient is fair.  Pertinent medical history includes: none.   Red flags:  None as reported by patient.  Medical allergies: none.   Surgeries include:  None.    Current medications: see Epic.    Current occupation is student.   Primary job tasks include:  Computer work and prolonged sitting.                  Therapist Generated HPI Evaluation  Problem details: MD order 4/23/20  Pt reports onset of left knee pain months ago, no specific injury but possibly overuse, but reports recent worsening Spring 2020 with increased biking. Notices it everytime after she bikes, or if she is sitting prolonged periods with the knee " Hi,    Can you please schedule this patient for surgery. Also, can you arrange for the patient to drop a urine sample for urine culture 1-2 weeks prior to the scheduled surgery date? I placed the order. Thanks,  [address]       Urology Surgery Request  Surgeon: Sonido Lawson  Location (if known): [facility]  Procedure: Cystoscopy, LEFT ureteroscopy, laser lithotripsy, stent placement  Anesthesia: General   Time Frame: Next available  Time required: 90 minutes  Diagnosis: Nephrolithiasis  Special Equipment: C-arm    Antibiotics: per hospital protocol unless checked below   ___ Levaquin 500 mg IV   ___ Gemcitabine 2 g/100 mL NS bladder instillation to be given in OR    Estimated Post Op/Follow Up Appt:   1 week for cystoscopy/stent removal in clinic with me. Please schedule appointment at time of surgery scheduling. bent. Usually near her kneecap. Recently doing 10 mile bike rides, 1-2x/week. Pain comes on second half and then after. This lingers for 1-2 day after.  Is also a rockclimber, so when she jumps off for bouldering she wonders if that causes it, but not neccesarily painful.   Does feel some right knee soreness d/t compensation..         Type of problem:  Left knee.    This is a chronic condition.  Condition occurred with:  Repetition/overuse.  Where condition occurred: during recreation/sport.  Site of Pain: above kneecap, some medially.  Pain is described as aching (dull) Pain frequency: recently more constant with more biking.  Radiates to: none. Pain is the same all the time.  Since onset symptoms are gradually worsening (with increased biking).  Associated symptoms:  Loss of motion/stiffness (denies/redness/swelling. some popping/crunching). Symptoms are exacerbated by other, ascending stairs and descending stairs (biking, sitting with knee bent)  and relieved by rest.  Imaging testing: none.  Past treatment: none. Improved with treatment: NA.  Work activity restrictions: student.  Barriers include:  None as reported by patient.                  Objective:          Flexibility/Screens:       Lower Extremity:  Decreased left lower extremity flexibility:Quadriceps                                                      Knee Evaluation:  ROM:  PROM: normal (but tightness across knee with end range passive knee flexion)  Strength wnl knee: medium fatigue with 15 reps SLR ABD.                Palpation:  Palpation of knee: assessment via video guided self palpation.    Left knee tenderness not present at:  Patellar Tendon; Patellar Medial; Patellar Lateral; Patellar Superior and Patellar Inferior        Functional Testing:          Quad:    Single Leg Squat:  Left:        Knee valgus, knee pain  Moderate loss of control and femoral IR  Right:       Normal control  Bilateral Leg Squat:  Moderate left knee pain  Excessive  anterior knee excursion            General   ROS    PTRx Content from today's visit:  Exercise Name: Hip Abduction Straight Leg Raise, Sets: 2 - Reps: 15-20 (goal)- each leg - Sessions: 3-4x/week, Notes: -keep hips stacked, do not let top hip roll back  -aim up and slightly back  -think of leading with your heel  Exercise Name: Clamshell Feet Apart, Sets: 2 - Reps: 15-20 (goal)- each leg - Sessions: 3-4x/week, Notes: *raise knee up, then maintain knee in that position as your rotate the leg to lift the foot up, then lower foot back down, then knee back down  Exercise Name: Single Leg Bridge - Reps: 5 reps, x 3-6 rounds each leg - Sessions: 3-4x/week, Notes: arms can be down by your sides  **Focus on squeezing glute/butt muscle as you lift  -try to keep hips level (do not let the floating leg side droop)    Pt ed - ed on exam findings, pathoanatomy, hip weakness, repetitive use, prolonged knee flexion issues. Ed on goals of POC. Encouraged continued exercise as able. Discussed ok to keep biking for now if tolerable and will see if PT will be enough to calm it down, but if things do not improve, will then have to rest from biking short term. Encouraged walking as able.    Next - quad stretching, SLR flexion, squatting form    Assessment/Plan:     Patient is a 16 year old female with left side knee complaints.    Patient has the following significant findings with corresponding treatment plan.                Diagnosis 1:  Left knee PFP    Pain -  self management, education and home program  Decreased ROM/flexibility - manual therapy, therapeutic exercise and home program  Decreased strength - therapeutic exercise, therapeutic activities and home program  Decreased proprioception - neuro re-education, therapeutic activities and home program  Decreased function - therapeutic activities and home program    Therapy Evaluation Codes:   1) History comprised of:   Personal factors that impact the plan of care:      None.     Comorbidity factors that impact the plan of care are:      None.     Medications impacting care: None.  2) Examination of Body Systems comprised of:   Body structures and functions that impact the plan of care:      Knee.   Activity limitations that impact the plan of care are:      Sitting, Stairs and biking.  3) Clinical presentation characteristics are:   Stable/Uncomplicated.  4) Decision-Making    Low complexity using standardized patient assessment instrument and/or measureable assessment of functional outcome.  Cumulative Therapy Evaluation is: Low complexity.    Previous and current functional limitations:  (See Goal Flow Sheet for this information)    Short term and Long term goals: (See Goal Flow Sheet for this information)     Communication ability:  Patient appears to be able to clearly communicate and understand verbal and written communication and follow directions correctly.  Treatment Explanation - The following has been discussed with the patient:   RX ordered/plan of care  Anticipated outcomes  Possible risks and side effects  This patient would benefit from PT intervention to resume normal activities.   Rehab potential is good.    Frequency:  2 X a month, once daily  Duration:  for 3 months  Discharge Plan:  Achieve all LTG.  Independent in home treatment program.  Reach maximal therapeutic benefit.    Please refer to the daily flowsheet for treatment today, total treatment time and time spent performing 1:1 timed codes.       Virtual visit contact time    Time of service began: 4:00 PM  Time of service ended: 4:40 PM  Total Time for set up, visit, and documentation: 43 minutes    Payor: [employer] / Plan: [employer] / Product Type: HMO /     Procedure Code/s   Therapeutic Exercise (57040): 12 minutes  Therapeutic Activities (83370): 8 minutes  Evaluation: 23 minutes    I have reviewed the note as documented above.  This accurately captures the substance of my  conversation with the patient.  Provider location[place] virtual [place]City[place]State)  Patient location: home    ___________________________________________________

## 2023-11-17 NOTE — PROGRESS NOTES
Urology Clinic Note    Primary Care Provider:  Whitley Chavez MD     Chief Complaint:   Nephrolithiasis     HPI:     Susanne Monroe is a 61year old male with history of HLD referred for nephrolithiasis. Has followed with Dr. Reuben Nix for many years. Has passed many stones spontaneously. Had URS on left side 2019, stone was CaOx. 24hr urine was low citrate, high oxalate. Had a AGUSTÍN 11/2021 per PCP which showed a left sided 4mm stone. Then established with me in 10/12/23. Reported good stone diet. No systemic symptoms. No voiding issues. Ua was negative. CTU done:   Stones are present left kidney, 2 located in the lower pole, 1 measuring 7 x 5 mm, another 5 x 5 mm. Smaller stone left mid pole 3 x 3 mm. Also small puntate right sided stone. His 24 hour urine test revealed low citrate (292). Blood work pending. Today, he reports no changes to the above history. Feels well, no flank pain. Has tried to keep healthy stone diet. Also adds that he occasionally gets some irritation to his foreskin (uncircumcised) during intercourse- has noticed small red rash laterally. Interested in treatment for this. PSA:  Lab Results   Component Value Date    PSA 1.41 11/29/2021    PSA 1.38 06/21/2018    PSAS 1.28 06/24/2019        History:     Past Medical History:   Diagnosis Date    Calculus of kidney     Extrinsic asthma, unspecified     Hearing loss     High cholesterol     Other and unspecified hyperlipidemia     Wears glasses        Past Surgical History:   Procedure Laterality Date    [facility]      ORAL SURGERY PROCEDURE      [place]    OTHER SURGICAL HISTORY      varicose vein to left testicle    OTHER SURGICAL HISTORY      sinus sx    OTHER SURGICAL HISTORY  03/21/2019    Cysto/ Stent Removal [facility]    TONSILLECTOMY         Family History   Problem Relation Age of Onset    Heart Attack Father     Mental Disorder Mother Social History     Socioeconomic History    Marital status:    Tobacco Use    Smoking status: Former    Smokeless tobacco: Former   Vaping Use    Vaping Use: Never used   Substance and Sexual Activity    Alcohol use: Yes     Comment: occasionally    Drug use: No       Medications (Active prior to today's visit):  Current Outpatient Medications   Medication Sig Dispense Refill    DULOXETINE 20 MG Oral Cap DR Particles TAKE ONE CAPSULE BY MOUTH TWICE DAILY 60 capsule 0    Cyanocobalamin (VITAMIN B 12) 500 MCG Oral Tab Take 1 tablet by mouth daily. Coenzyme Q10 100 MG Oral Cap 1 capsule with a meal Orally Once a day      levothyroxine 50 MCG Oral Tab 1 tablet in the morning on an empty stomach Orally Once a day for 90 days      methylPREDNISolone 4 MG Oral Tablet Therapy Pack       Multiple Vitamin (MULTI VITAMIN) Oral Tab Take 1 tablet by mouth daily. polypropylene glycol- (SYSTANE) 0.4-0.3 % Ophthalmic Solution as directed Ophthalmic      tadalafil 5 MG Oral Tab take 1 tablet by mouth daily as needed 1 hour prior to intercourse as directed Orally as directed for 90 days      Meloxicam 15 MG Oral Tab Take 1 tablet (15 mg total) by mouth daily. 30 tablet 0    doxycycline 100 MG Oral Cap Take 1 capsule (100 mg total) by mouth daily. 90 capsule 1    neomycin-polymyxin-dexamethasone 3.5-89809-1.1 Ophthalmic Ointment Place 1 Application. into both eyes 3 (three) times daily. 3.5 g 1    simvastatin 20 MG Oral Tab Take 1 tablet (20 mg total) by mouth every evening. cyclobenzaprine 5 MG Oral Tab       hydrocortisone 2.5 % External Ointment Apply 1 Application topically 2 (two) times daily. 20 g 3    loratadine 10 MG Oral Tab Take 1 tablet (10 mg total) by mouth daily. Allergies: Allergies   Allergen Reactions    Sulfamethoxazole W/Trimethoprim TONGUE SWELLING       Review of Systems:   A comprehensive 10-point review of systems was completed.   Pertinent positives and negatives are noted in the the HPI. Physical Exam:   CONSTITUTIONAL: Well developed, well nourished, in no acute distress  NEUROLOGIC: Alert and oriented  HEAD: Normocephalic, atraumatic  EYES: Sclera non-icteric  ENT: Hearing intact, moist mucous membranes  NECK: No obvious goiter or masses  RESPIRATORY: Normal respiratory effort  SKIN: No evident rashes  ABDOMEN: Soft, non-tender, non-distended  GENITOURINARY: Normal phallus- foreskin able to easily be retracted; slight redness noted along lateral aspect of foreskin on either side   orthotopic meatus, normal bilateral testicles     CT ABDOMEN+PELVIS KIDNEYSTONE 2D RNDR(NO IV,NO ORAL)(CPT=74176)    Result Date: 10/19/2023  CONCLUSION:  Nonobstructing kidney stones. No hydronephrosis or hydroureter. The urinary bladder is not well distended. The wall appears thickened, which could be a consequence of the under distention, and can also be seen with cystitis. Advise correlation with any potential clinical signs or symptoms of cystitis, and correlation with urinalysis. LOCATION:  [facility]   Dictated by (CST): Radhika Lares MD on 10/19/2023 at 8:19 AM     Finalized by (CST): Radhika Lares MD on 10/19/2023 at 8:22 AM          Assessment & Plan:     John Valles is a 61year old male with history of HLD referred for nephrolithiasis. # NL    CT scan with large stone burden on left, small on right. We had a thorough discussion about the indications, risks, benefits, and alternatives available for management of his stones, specifically observation, PCNL and URS. After thorough consideration, he has elected to proceed with URS. Based on the number, size, and location of his stone(s), I believe ureteroscopy with holmium laser lithotripsy is an appropriate treatment option and is in line with his goals. We discussed the risks of infection, bleeding, damage to the ureter with subsequent stricture formation, and the need for additional procedures.  I told him that, in rare circumstances, we are unable to advance the ureteroscope into the ureter and would be formed to simple place a ureteral stent and attempt ureteroscopy again after two weeks of passive dilation. We also discussed the possibility of post-op stent placement, even when the procedure is successfully performed, in order to encourage proper healing and prevent scar formation. Will need a urine culture 2 weeks before. We also discussed completing metabolic workup which he will do today (Pth, ca, uric acid). Will also check a BMP to evaluate K levels before starting K citrate which he would like to do given his history and 24 hour urine results. Plan to recheck 24 hour urine a few months after starting therapy. Will also need a repeat BMP. # Balanitis  Very mild balanitis noted on either side of foreskin laterally. No significant phimosis noted. Discussed options including topical therapies- will trial Lotrizone cream. If issues persist or he continues to have pain with intercourse we discussed option for circumcision. He will consider this. Discussed hygeiene in detail. In total, 30 minutes were spent on this patient encounter (including chart review, patient history, physical, and counseling, documentation, and communication).     Dafne Palacios MD  Staff Urologist  [facility]  Office: [phone]

## 2023-11-17 NOTE — TELEPHONE ENCOUNTER
Per pharmacy needs clarification on quantity of potassium citrate, asking if quantity should be 180 instead of 90. Please advise thank you.

## 2023-12-01 ENCOUNTER — OFFICE VISIT (OUTPATIENT)
Dept: PHYSICAL THERAPY | Age: 63
End: 2023-12-01
Attending: PODIATRIST
Payer: COMMERCIAL

## 2023-12-01 PROCEDURE — 97140 MANUAL THERAPY 1/> REGIONS: CPT

## 2023-12-01 PROCEDURE — 97110 THERAPEUTIC EXERCISES: CPT

## 2023-12-01 NOTE — PROGRESS NOTES
Diagnosis:       Achilles tendinosis of right ankle (Q12.092)        Referring Provider: Christina Bello  Date of Evaluation:    10/5/23    Precautions:  None Next MD visit:   none scheduled  Date of Surgery: n/a   Insurance Primary/Secondary: [address] [place], [place] [address]O / N/A     # Auth Visits: 8            Subjective:    right side is still bothering me. End of the day I feel it and in the am. [facility] dog for one hour, and no pain. Last night more swollen and had to ice it. January 2 nd kidney procedure    Pain: 1/10      Objective:  DF R ankle 20 degs  Tender R achilles with palpation and tooling       Assessment:    can still feel discomfort on the L HS attachment  with SL balance   Started to take one level off the heel lift - one left his shoe   Pt may have more symptoms with sitting and not moving - with walking it does feel better. Goals:   Pt will report picking up pace walking and not having R achilles pain  Pt will demonstrate full ROM on the R ankle without any pain in the achilles for ADLs  Full heel strike on the R with ambulation and will negotiate uneven surfaces without any discomfort. Pt will demonstrate improved B hip mobility- he is seeing Ford Stout PT for low back pain and radiculopathy on the R- working on this with her as well. I with HEP  Can return to running without any pain on the R achilles     Plan: cont with PT .     Date: 10/25/2023  TX#: 2/8 Date:                 TX#: 3/ Date:   11/10/23            TX#: 4/8 Date:   11/15/23              TX#: 5/8 Date: 12/1/23  Tx#: 6/8   Rocker board 10 x 2  B   SL R rocker board   Balance only   Toe raises  on foam 10 x 2     Cupping R gastroc  Toll achilles 10 mins   Ankle PROM stretches   Reformer 4 cords 10 x 2     Taped with KT today today  Nustep 2 mins feet flat   One up on toes  Step ups on bosu 10 x   Rocker board A/P 10 x   B and R only   Baps LV 2 10 x R only A/P   M/L  STM tool R achilles  HS MFR L 3 mins   IR/ER hip passive stretch 10 x each  Nustep 2 mins feet flat one min toes only   Step ups bosu 10 x each     Reformer 7 cords squats /PF 10 x 2   SL bridge 10 x on plinth   Gastroc stretch 3 x 30 secs    STM /tool R achilles   15 mins MT  Nustep 5 mins LE only   Reformer squats and PF 6 cords  Tool L HS attachment   R achilles 10 mins     Gastroc stretch 3 x 3 secs B   Game ready 10 mins 40 degs low compression R  Nustep 5 mins   TRX squats 10 x   SL reach downs    3#    Mame disc balance with ball rolling 10 x each   Lateral and anterior 10 x each   Standing HS stretch 2 x 30 secs   EX 2   STM R achilles/PROM R ankle 15 mins MT :1                           HEP: see above     Charges: EX 2 MT 1       Total Timed Treatment: 45 min  Total Treatment Time: 45 min

## 2023-12-08 ENCOUNTER — OFFICE VISIT (OUTPATIENT)
Dept: PHYSICAL THERAPY | Age: 63
End: 2023-12-08
Attending: PODIATRIST
Payer: COMMERCIAL

## 2023-12-08 PROCEDURE — 97140 MANUAL THERAPY 1/> REGIONS: CPT

## 2023-12-08 PROCEDURE — 97110 THERAPEUTIC EXERCISES: CPT

## 2023-12-08 NOTE — PROGRESS NOTES
Diagnosis:       Achilles tendinosis of right ankle (N13.411)        Referring Provider: Taylor Brand  Date of Evaluation:    10/5/23    Precautions:  None Next MD visit:   none scheduled  Date of Surgery: n/a   Insurance Primary/Secondary: [address] [place] [address] / N/A     # Auth Visits: 8            Subjective:     feeling about the same. 40 percent better R achilles, planning on following up with Dr Taylor Brand     Pain:  siting still. With walking and stairs 1/10. Objective:  DF R ankle 20 degs ( has remained the same)  Full PF, IR and EV on the R   Tender R achilles with palpation and tooling       Assessment:     pt is feeling 40 percent better, will follow up with Dr Taylor Brand to determine if we should continue with PT or a different treatment. He did feel better post visit today, still has not had a 0/10 day in the R achilles, but is feeling less pain overall, still uncomfortable going up stairs. Goals:   Pt will report picking up pace walking and not having R achilles pain- improved  Pt will demonstrate full ROM on the R ankle without any pain in the achilles for ADLs- improved   Full heel strike on the R with ambulation and will negotiate uneven surfaces without any discomfort. Pt will demonstrate improved B hip mobility- he is seeing Blanca Ley PT for low back pain and radiculopathy on the R- working on this with her as well. I with HEP  Can return to running without any pain on the R achilles   Ascend stairs no pain - not met 12/8/    Plan: await to hear from Pt regarding continuing PT.    Date: 10/25/2023  TX#: 2/8 Date:                 TX#: 3/ Date:   11/10/23            TX#: 4/8 Date:   11/15/23              TX#: 5/8 Date: 12/1/23  Tx#: 6/8 12/8/23 7/8     Rocker board 10 x 2  B   SL R rocker board   Balance only   Toe raises  on foam 10 x 2     Cupping R gastroc  Toll achilles 10 mins   Ankle PROM stretches   Reformer 4 cords 10 x 2     Taped with KT today today  Nustep 2 mins feet flat   One up on toes  Step ups on bosu 10 x   Rocker board A/P 10 x   B and R only   Baps LV 2 10 x R only A/P   M/L  STM tool R achilles  HS MFR L 3 mins   IR/ER hip passive stretch 10 x each  Nustep 2 mins feet flat one min toes only   Step ups bosu 10 x each     Reformer 7 cords squats /PF 10 x 2   SL bridge 10 x on plinth   Gastroc stretch 3 x 30 secs    STM /tool R achilles   15 mins MT  Nustep 5 mins LE only   Reformer squats and PF 6 cords  Tool L HS attachment   R achilles 10 mins     Gastroc stretch 3 x 3 secs B   Game ready 10 mins 40 degs low compression R  Nustep 5 mins   TRX squats 10 x   SL reach downs    3#    Mame disc balance with ball rolling 10 x each   Lateral and anterior 10 x each   Standing HS stretch 2 x 30 secs   EX 2   STM R achilles/PROM R ankle 15 mins MT :1    Nustep 2 mins flat  One min toes only   Rocker board 10 x B  SL 10 x each side  Mame disc  Ball 1# rolling   Lunge in bars 10 x 2   Too painful on the R knee stopped today    STM achilles 10 mins   Standing gastroc stretch 3  x30 secs                            HEP: see above     Charges: EX 2 MT 1       Total Timed Treatment: 45 min  Total Treatment Time: 45 min

## 2023-12-12 ENCOUNTER — OFFICE VISIT (OUTPATIENT)
Dept: ORTHOPEDICS CLINIC | Facility: CLINIC | Age: 63
End: 2023-12-12
Payer: COMMERCIAL

## 2023-12-12 VITALS — BODY MASS INDEX: 26.96 KG/M2 | HEIGHT: 69 IN | WEIGHT: 182 LBS

## 2023-12-12 DIAGNOSIS — M67.873 ACHILLES TENDINOSIS OF RIGHT ANKLE: Primary | ICD-10-CM

## 2023-12-12 PROCEDURE — 99214 OFFICE O/P EST MOD 30 MIN: CPT | Performed by: PODIATRIST

## 2023-12-12 PROCEDURE — 3008F BODY MASS INDEX DOCD: CPT | Performed by: PODIATRIST

## 2023-12-12 NOTE — PROGRESS NOTES
[facility] Progress Note    Subjective:   Isela Plan is back for follow up  Resting does not help and going up stairs aggravates    Finished PT last week  Feels in last month swelling is better  Still sore  He is hoping to have more PT and physical therapist thinks further PT would be good    Using small lift per PT      Objective:     Medially distal achilles pain persists right ankle  Thickening and scarring still present. This is proximal to the insertional area          Imaging: No new imaging        Assessment/Plan:     Diagnoses and all orders for this visit:    Achilles tendinosis of right ankle        Will try to get compound cream - voltaren/diclofenac as he has a higher strength over-the-counter from [employer]. We will try to get him some from the compounding company      MRI next step - reach out on my chart if he wants to proceed  MRI of the ankle is being considerred to help determine other pathology such as internal derangement, bone bruising, ligamentous damage, occult fracture, tendonitis, osteochondral lesions among others. In this case mainly to see if worsening tears in achilles tendon and integrity of tendon    PT renewed  Again watch any kind of running jumping pushing off activity and stick mainly to nonweightbearing such as biking etc.    We did brief discuss PRP option   also with Dr Judi Dunn - toradol injection of sheath could be considered  Lastly surgical debridement    Follow up after PT      Gabriel Mchugh. Kyara Armendariz DPM  [facility]    [employer] speech recognition software was used to prepare this note. If a word or phrase is confusing, it is likely do to a failure of recognition. Please contact me with any questions or clarifications.

## 2023-12-24 ENCOUNTER — TELEPHONE (OUTPATIENT)
Dept: SURGERY | Facility: CLINIC | Age: 63
End: 2023-12-24

## 2023-12-26 ENCOUNTER — LAB ENCOUNTER (OUTPATIENT)
Dept: LAB | Age: 63
End: 2023-12-26
Attending: UROLOGY
Payer: COMMERCIAL

## 2023-12-26 DIAGNOSIS — N20.0 KIDNEY STONE: ICD-10-CM

## 2023-12-26 LAB
BILIRUB UR QL STRIP.AUTO: NEGATIVE
CLARITY UR REFRACT.AUTO: CLEAR
GLUCOSE UR STRIP.AUTO-MCNC: NORMAL MG/DL
KETONES UR STRIP.AUTO-MCNC: NEGATIVE MG/DL
LEUKOCYTE ESTERASE UR QL STRIP.AUTO: NEGATIVE
NITRITE UR QL STRIP.AUTO: NEGATIVE
PH UR STRIP.AUTO: 6.5 [PH] (ref 5–8)
PROT UR STRIP.AUTO-MCNC: NEGATIVE MG/DL
RBC UR QL AUTO: NEGATIVE
SP GR UR STRIP.AUTO: 1.01 (ref 1–1.03)
UROBILINOGEN UR STRIP.AUTO-MCNC: NORMAL MG/DL

## 2023-12-26 PROCEDURE — 81003 URINALYSIS AUTO W/O SCOPE: CPT

## 2023-12-28 ENCOUNTER — OFFICE VISIT (OUTPATIENT)
Facility: LOCATION | Age: 63
End: 2023-12-28
Payer: COMMERCIAL

## 2023-12-28 DIAGNOSIS — H93.13 TINNITUS OF BOTH EARS: Primary | ICD-10-CM

## 2023-12-28 DIAGNOSIS — H90.3 SENSORY HEARING LOSS, BILATERAL: ICD-10-CM

## 2023-12-28 DIAGNOSIS — H90.3 SENSORINEURAL HEARING LOSS (SNHL) OF BOTH EARS: ICD-10-CM

## 2023-12-28 DIAGNOSIS — J32.8 OTHER CHRONIC SINUSITIS: Primary | ICD-10-CM

## 2023-12-28 PROCEDURE — 92557 COMPREHENSIVE HEARING TEST: CPT | Performed by: AUDIOLOGIST

## 2023-12-28 PROCEDURE — 92567 TYMPANOMETRY: CPT | Performed by: AUDIOLOGIST

## 2023-12-28 PROCEDURE — 99204 OFFICE O/P NEW MOD 45 MIN: CPT | Performed by: OTOLARYNGOLOGY

## 2023-12-28 NOTE — PROGRESS NOTES
Susanne Monroe was seen for an audiometric evaluation and tympanogram today. Referred back to physician. Charol Kawasaki, M.A.  ULYSSESA

## 2023-12-28 NOTE — PROGRESS NOTES
[facility], [facility] [place] Kusum Gillespie    Report of Consultation    Date of Consult: 12/28/2023     Reason for Consultation:   Decreased hearing. History of Present Illness:   Patient is a 61year old male who is being seen for decreased hearing. Patient feels that he is having increased difficulty hearing recently. At times he has to have people repeat things. I performed sinus surgery on him 10 years ago and he actually has done quite well. He rarely has infections. He occasionally notes congestion on both sides of his nose but this appears to be the normal function of the turbinates. He denies fevers chills purulent rhinorrhea. Past Medical History  Past Medical History:   Diagnosis Date    Back problem     Calculus of kidney     Depression     Extrinsic asthma, unspecified     Hearing loss     High cholesterol     Other and unspecified hyperlipidemia     Visual impairment     glasses    Wears glasses        Past Surgical History  Past Surgical History:   Procedure Laterality Date    COLONOSCOPY  7-31-12 [facility]    COLONOSCOPY      ORAL SURGERY PROCEDURE      [place] Teeth    OTHER SURGICAL HISTORY      varicose vein to left testicle    OTHER SURGICAL HISTORY      sinus sx    OTHER SURGICAL HISTORY  03/21/2019    Cysto/ Stent Removal [facility]    TONSILLECTOMY         Family History  Family History   Problem Relation Age of Onset    Heart Attack Father     Mental Disorder Mother        Social History  Pediatric History   Patient Parents    Not on file     Other Topics Concern    Not on file   Social History Narrative    Not on file           Current Medications:  Current Outpatient Medications   Medication Sig Dispense Refill    DULoxetine 20 MG Oral Cap DR Particles Take 1 capsule (20 mg total) by mouth 2 (two) times daily. 60 capsule 0    Ciclopirox 8 % External Solution Apply 1 Application topically nightly.  Apply to toenail 6 mL 3    neomycin-polymyxin-dexamethasone 3.5-64757-0.1 Ophthalmic Ointment Place 1 Application  into both eyes 3 (three) times daily. (Patient taking differently: Place 1 Application  into both eyes as needed.) 3.5 g 3    doxycycline 100 MG Oral Cap Take 1 capsule (100 mg total) by mouth daily. 90 capsule 1    clotrimazole-betamethasone 1-0.05 % External Cream Apply 1 Application topically 2 (two) times daily. Apply to affected area for 10-14 days, then stop. Shower/clean area daily. If disorder recurs in the future, please restart medication 45 g 1    Potassium Citrate ER (UROCIT-K 15) 15 MEQ (1620 MG) Oral Tab CR Take 1 tablet by mouth in the morning and 1 tablet before bedtime. 90 tablet 5    Cyanocobalamin (VITAMIN B 12) 500 MCG Oral Tab Take 1 tablet by mouth daily. Coenzyme Q10 100 MG Oral Cap 1 capsule with a meal Orally Once a day      levothyroxine 50 MCG Oral Tab 1 tablet in the morning on an empty stomach Orally Once a day for 90 days      Multiple Vitamin (MULTI VITAMIN) Oral Tab Take 1 tablet by mouth daily. polypropylene glycol- (SYSTANE) 0.4-0.3 % Ophthalmic Solution Place 1 drop into both eyes as needed. tadalafil 5 MG Oral Tab take 1 tablet by mouth daily as needed 1 hour prior to intercourse as directed Orally as directed for 90 days      Meloxicam 15 MG Oral Tab Take 1 tablet (15 mg total) by mouth daily. (Patient taking differently: Take 1 tablet (15 mg total) by mouth as needed.) 30 tablet 0    simvastatin 20 MG Oral Tab Take 1 tablet (20 mg total) by mouth every evening. cyclobenzaprine 5 MG Oral Tab 1 tablet (5 mg total) 3 (three) times daily as needed. hydrocortisone 2.5 % External Ointment Apply 1 Application topically 2 (two) times daily. (Patient taking differently: Apply 1 Application  topically as needed.) 20 g 3    loratadine 10 MG Oral Tab Take 1 tablet (10 mg total) by mouth daily.          Allergies  Allergies   Allergen Reactions    Sulfamethoxazole W/Trimethoprim TONGUE SWELLING Review of Systems:   A comprehensive review of systems was negative. Physical Exam:   There were no vitals taken for this visit. Constitutional Normal Overall appearance - Normal.   Psychiatric Normal Orientation - Oriented to time, place, person & situation. Appropriate mood and affect. Head/Face Normal Facial features -- Normal. Skull - Normal.   Eyes Normal Pupils equal ,round ,react to light and accomidate   Ears Normal External Ear Right: Normal, Left: Normal. Canal - Right: Normal, Left: Normal. TM - Right: Normal, Left: Normal.   Nose Normal External Nose, Normal, Septum -Midline, maxillary ostium appear widely patent with normal middle turbinates. Mouth/Throat Normal Lips/teeth/gums - Normal. Tonsils - Normal. Oropharynx - Normal.   Neck Exam Normal Inspection - Normal. Palpation - Normal. Parotid gland - Normal. Thyroid gland - Normal.   Neurological Normal Memory - Normal. Cranial nerves - Cranial nerves II through XII grossly intact. Nasopharynx Normal  Normal        Skin Normal Inspection - Normal.        Lymph Detail Normal Submental. Submandibular. Anterior cervical. Posterior cervical. Supraclavicular. Audiogram shows sloping mild to moderate high-frequency loss which appears symmetric with normal tympanogram.        Results:     Laboratory Data:  Lab Results   Component Value Date    WBC 4.5 06/24/2019    HGB 13.9 06/24/2019    HCT 43.1 06/24/2019    .0 06/24/2019    CREATSERUM 0.94 11/17/2023    BUN 11 11/17/2023     11/17/2023    K 4.1 11/17/2023     11/17/2023    CO2 29.0 11/17/2023    GLU 76 11/17/2023    CA 9.2 11/17/2023    ALB 4.3 06/24/2019    ALKPHO 81 06/24/2019    TP 7.1 06/24/2019    AST 20 06/24/2019    ALT 32 06/24/2019    TSH 3.150 06/24/2019    PSA 1.41 11/29/2021    B12 303 06/24/2019         Imaging:  No results found. Impression:   Doing well from the standpoint of the sinuses. There is no sign of infection at this time.   He does have sensorineural hearing loss bilateral.    Recommendations:  He should continue using NeilMed sinus rinse. The patient may follow-up with our audiologist regarding amplification. Patient understands agrees with her treatment plan. Thank you for allowing me to participate in the care of your patient.       Paul Panda MD  12/28/2023

## 2023-12-31 NOTE — H&P
UROLOGY PRE-OPERATIVE HISTORY & PHYSICAL      Werner Parks Patient Status:  Hospital Outpatient Surgery    1960 MRN [id number]   Location [facility] SURGERY Attending Paul Zapata MD   Hosp Day # 0 PCP Elif Aguilera MD     Primary Care Provider: Elif Aguilera MD     Procedure     CYSTOSCOPY, LEFT URETEROSCOPY, LASER LITHOTRIPSY, STENT PLACEMENT:     History of Present Illness:       Werner Parks is a 63 year old male with history of HLD referred for nephrolithiasis.     Has followed with Dr. Adamson for many years. Has passed many stones spontaneously.   Had URS on left side , stone was CaOx.   24hr urine was low citrate, high oxalate.      Had a AGUSTÍN 2021 per PCP which showed a left sided 4mm stone. Then established with me in 10/12/23.   Reported good stone diet. No systemic symptoms. No voiding issues.   Ua was negative.      CTU done:   Stones are present left kidney, 2 located in the lower pole, 1 measuring 7 x 5 mm, another 5 x 5 mm.  Smaller stone left mid pole 3 x 3 mm.  Also small puntate right sided stone.   His 24 hour urine test revealed low citrate (292).   Blood work normal.   Considering urocit k once surgery completed.     He also has recent balanitis/phimosis-   He is interested in circumcision in the future as he states cream did not help; he wants to wait until after kidney stones are managed.     He now presents for LEFT sided stone management. Options for observation, PCNL, URS, ESWL all discussed. Based on the number, size, and location of his stone(s), I believe ureteroscopy with holmium laser lithotripsy is an appropriate treatment option and is in line with his goals. We discussed the risks of infection, bleeding, damage to the ureter with subsequent stricture formation, and the need for additional procedures. I told him that, in rare circumstances, we are unable to advance the ureteroscope into the ureter and would be formed to  simple place a ureteral stent and attempt ureteroscopy again after two weeks of passive dilation. We also discussed the possibility of post-op stent placement, even when the procedure is successfully performed, in order to encourage proper healing and prevent scar formation.      Ucx: Negative     Ancef   Blood thinner: None  Stent size 6x26 JJ  5' 9\" (1.753 m)    History:     Past Medical History:   Diagnosis Date    Back problem     Calculus of kidney     Depression     Extrinsic asthma, unspecified     Hearing loss     High cholesterol     History of COVID-19 05/2023    Had cough, \"flu like\"  S/S x 1 week. No hospitalization.    Other and unspecified hyperlipidemia     Visual impairment     glasses    Wears glasses        Past Surgical History:   Procedure Laterality Date    COLONOSCOPY  7-31-12 [facility]    COLONOSCOPY      ORAL SURGERY PROCEDURE      [place] Teeth    OTHER SURGICAL HISTORY      varicose vein to left testicle    OTHER SURGICAL HISTORY      sinus sx    OTHER SURGICAL HISTORY  03/21/2019    Cysto/ Stent Removal [facility]    TONSILLECTOMY         Family History   Problem Relation Age of Onset    Heart Attack Father     Mental Disorder Mother        Social History     Socioeconomic History    Marital status:    Tobacco Use    Smoking status: Former     Types: Cigarettes     Passive exposure: Past (1980s-1990s from ex wife)    Smokeless tobacco: Former    Tobacco comments:     Social 5 cigarettes/week   Vaping Use    Vaping Use: Never used   Substance and Sexual Activity    Alcohol use: Yes     Comment: occasionally    Drug use: No       Medications:  Current Outpatient Medications   Medication Sig Dispense Refill    Ciclopirox 8 % External Solution Apply 1 Application topically nightly. Apply to toenail 6 mL 3    neomycin-polymyxin-dexamethasone 3.5-21894-3.1 Ophthalmic Ointment Place 1 Application  into both eyes 3 (three) times daily. (Patient taking differently: Place 1 Application   into both eyes as needed.) 3.5 g 3    doxycycline 100 MG Oral Cap Take 1 capsule (100 mg total) by mouth daily. 90 capsule 1    clotrimazole-betamethasone 1-0.05 % External Cream Apply 1 Application topically 2 (two) times daily. Apply to affected area for 10-14 days, then stop. Shower/clean area daily.  If disorder recurs in the future, please restart medication 45 g 1    Potassium Citrate ER (UROCIT-K 15) 15 MEQ (1620 MG) Oral Tab CR Take 1 tablet by mouth in the morning and 1 tablet before bedtime. 90 tablet 5    Cyanocobalamin (VITAMIN B 12) 500 MCG Oral Tab Take 1 tablet by mouth daily.      Coenzyme Q10 100 MG Oral Cap 1 capsule with a meal Orally Once a day      levothyroxine 50 MCG Oral Tab 1 tablet in the morning on an empty stomach Orally Once a day for 90 days      Multiple Vitamin (MULTI VITAMIN) Oral Tab Take 1 tablet by mouth daily.      polypropylene glycol- (SYSTANE) 0.4-0.3 % Ophthalmic Solution Place 1 drop into both eyes as needed.      tadalafil 5 MG Oral Tab take 1 tablet by mouth daily as needed 1 hour prior to intercourse as directed Orally as directed for 90 days      Meloxicam 15 MG Oral Tab Take 1 tablet (15 mg total) by mouth daily. (Patient taking differently: Take 1 tablet (15 mg total) by mouth as needed.) 30 tablet 0    simvastatin 20 MG Oral Tab Take 1 tablet (20 mg total) by mouth every evening.      cyclobenzaprine 5 MG Oral Tab 1 tablet (5 mg total) 3 (three) times daily as needed.      hydrocortisone 2.5 % External Ointment Apply 1 Application topically 2 (two) times daily. (Patient taking differently: Apply 1 Application  topically as needed.) 20 g 3    loratadine 10 MG Oral Tab Take 1 tablet (10 mg total) by mouth daily.      DULoxetine 20 MG Oral Cap DR Particles Take 1 capsule (20 mg total) by mouth 2 (two) times daily. 60 capsule 0       Allergies:  Allergies   Allergen Reactions    Sulfamethoxazole W/Trimethoprim TONGUE SWELLING       Review of Systems:   A  comprehensive 10-point review of systems was completed.  Pertinent positives and negatives are noted in the the HPI.    Physical Exam:   Vital Signs:  Height 5' 9\" (1.753 m), weight 180 lb (81.6 kg).     CONSTITUTIONAL: Well developed, well nourished, in no acute distress   RESPIRATORY: Normal respiratory effort  ABDOMEN: Soft, non-tender, non-distended      Laboratory Data:  Lab Results   Component Value Date    WBC 4.5 06/24/2019    HGB 13.9 06/24/2019    .0 06/24/2019     Lab Results   Component Value Date     11/17/2023    K 4.1 11/17/2023     11/17/2023    CO2 29.0 11/17/2023    BUN 11 11/17/2023    GLU 76 11/17/2023    GFRAA 105 06/24/2019    AST 20 06/24/2019    ALT 32 06/24/2019    TP 7.1 06/24/2019    ALB 4.3 06/24/2019    CA 9.2 11/17/2023       Urinalysis Results (last three years):  Recent Labs     11/29/21  1544 10/12/23  0831 11/17/23  0944 12/26/23  1122   COLORUR  --   --   --  Light-Yellow   CLARITY  --   --   --  Clear   SPECGRAVITY 1.015 1.025 1.015 1.011   PHURINE 6.5 5.5 7.5 6.5   PROUR  --   --   --  Negative   GLUUR  --   --   --  Normal   KETUR  --   --   --  Negative   BILUR  --   --   --  Negative   BLOODURINE  --   --   --  Negative   NITRITE Negative Negative Negative Negative   UROBILINOGEN  --   --   --  Normal   LEUUR  --   --   --  Negative       Urine Culture Results (last three years):  Lab Results   Component Value Date    URINECUL No Growth 2 Days 02/26/2019    URINECUL No Growth at 18-24 hrs. 02/25/2019       PSA:  Lab Results   Component Value Date    PSA 1.41 11/29/2021    PSA 1.38 06/21/2018    PSAS 1.28 06/24/2019        Imaging (last three days):  No results found.     Assessment:   Werner Parks is a 63 year old y/o male who presents for the above stated procedure.       Plan:     - OR for CYSTOSCOPY, LEFT URETEROSCOPY, LASER LITHOTRIPSY, STENT PLACEMENT:   - NPO since midnight   - Antibiotics ordered for OR   - Informed consent obtained -  risks and benefits explained, and all questions answered  - Marked appropriately     I have personally reviewed all relevant medical records, labs, and imaging.       Paul Zapata MD  Staff Urologist  [facility]  Office: [phone]

## 2024-01-02 ENCOUNTER — HOSPITAL ENCOUNTER (OUTPATIENT)
Facility: HOSPITAL | Age: 64
Setting detail: HOSPITAL OUTPATIENT SURGERY
Discharge: HOME | End: 2024-01-02
Attending: UROLOGY | Admitting: UROLOGY
Payer: COMMERCIAL

## 2024-01-02 ENCOUNTER — APPOINTMENT (OUTPATIENT)
Dept: GENERAL RADIOLOGY | Facility: HOSPITAL | Age: 64
End: 2024-01-02
Attending: UROLOGY
Payer: COMMERCIAL

## 2024-01-02 ENCOUNTER — ANESTHESIA EVENT (OUTPATIENT)
Dept: SURGERY | Facility: HOSPITAL | Age: 64
End: 2024-01-02
Payer: COMMERCIAL

## 2024-01-02 ENCOUNTER — ANESTHESIA (OUTPATIENT)
Dept: SURGERY | Facility: HOSPITAL | Age: 64
End: 2024-01-02
Payer: COMMERCIAL

## 2024-01-02 VITALS
WEIGHT: 182 LBS | TEMPERATURE: 97 F | DIASTOLIC BLOOD PRESSURE: 79 MMHG | HEIGHT: 69 IN | OXYGEN SATURATION: 96 % | HEART RATE: 83 BPM | RESPIRATION RATE: 16 BRPM | BODY MASS INDEX: 26.96 KG/M2 | SYSTOLIC BLOOD PRESSURE: 117 MMHG

## 2024-01-02 DIAGNOSIS — N20.0 NEPHROLITHIASIS: ICD-10-CM

## 2024-01-02 PROCEDURE — 0TC18ZZ EXTIRPATION OF MATTER FROM LEFT KIDNEY, VIA NATURAL OR ARTIFICIAL OPENING ENDOSCOPIC: ICD-10-PCS | Performed by: UROLOGY

## 2024-01-02 PROCEDURE — 0T778DZ DILATION OF LEFT URETER WITH INTRALUMINAL DEVICE, VIA NATURAL OR ARTIFICIAL OPENING ENDOSCOPIC: ICD-10-PCS | Performed by: UROLOGY

## 2024-01-02 PROCEDURE — 74420 UROGRAPHY RTRGR +-KUB: CPT | Performed by: UROLOGY

## 2024-01-02 PROCEDURE — BT1FZZZ FLUOROSCOPY OF LEFT KIDNEY, URETER AND BLADDER: ICD-10-PCS | Performed by: UROLOGY

## 2024-01-02 PROCEDURE — 52356 CYSTO/URETERO W/LITHOTRIPSY: CPT | Performed by: UROLOGY

## 2024-01-02 DEVICE — URETERAL STENT
Type: IMPLANTABLE DEVICE | Site: URETER | Status: FUNCTIONAL
Brand: ASCERTA™

## 2024-01-02 RX ORDER — OXYBUTYNIN CHLORIDE 5 MG/1
5 TABLET ORAL 3 TIMES DAILY PRN
Qty: 15 TABLET | Refills: 0 | Status: SHIPPED | OUTPATIENT
Start: 2024-01-02

## 2024-01-02 RX ORDER — CEPHALEXIN 500 MG/1
500 CAPSULE ORAL 3 TIMES DAILY
Qty: 9 CAPSULE | Refills: 0 | Status: SHIPPED | OUTPATIENT
Start: 2024-01-03 | End: 2024-01-06

## 2024-01-02 RX ORDER — SODIUM CHLORIDE, SODIUM LACTATE, POTASSIUM CHLORIDE, CALCIUM CHLORIDE 600; 310; 30; 20 MG/100ML; MG/100ML; MG/100ML; MG/100ML
INJECTION, SOLUTION INTRAVENOUS CONTINUOUS
Status: DISCONTINUED | OUTPATIENT
Start: 2024-01-02 | End: 2024-01-02

## 2024-01-02 RX ORDER — POLYETHYLENE GLYCOL 3350 17 G/17G
17 POWDER, FOR SOLUTION ORAL DAILY PRN
Qty: 20 PACKET | Refills: 1 | Status: SHIPPED | OUTPATIENT
Start: 2024-01-02

## 2024-01-02 RX ORDER — CEFAZOLIN SODIUM/WATER 2 G/20 ML
2 SYRINGE (ML) INTRAVENOUS ONCE
Status: COMPLETED | OUTPATIENT
Start: 2024-01-02 | End: 2024-01-02

## 2024-01-02 RX ORDER — PROCHLORPERAZINE EDISYLATE 5 MG/ML
5 INJECTION INTRAMUSCULAR; INTRAVENOUS EVERY 8 HOURS PRN
Status: DISCONTINUED | OUTPATIENT
Start: 2024-01-02 | End: 2024-01-02

## 2024-01-02 RX ORDER — ACETAMINOPHEN 500 MG
1000 TABLET ORAL ONCE AS NEEDED
Status: COMPLETED | OUTPATIENT
Start: 2024-01-02 | End: 2024-01-02

## 2024-01-02 RX ORDER — ONDANSETRON 2 MG/ML
4 INJECTION INTRAMUSCULAR; INTRAVENOUS EVERY 6 HOURS PRN
Status: DISCONTINUED | OUTPATIENT
Start: 2024-01-02 | End: 2024-01-02

## 2024-01-02 RX ORDER — KETOROLAC TROMETHAMINE 30 MG/ML
15 INJECTION, SOLUTION INTRAMUSCULAR; INTRAVENOUS ONCE
Status: COMPLETED | OUTPATIENT
Start: 2024-01-02 | End: 2024-01-02

## 2024-01-02 RX ORDER — LABETALOL HYDROCHLORIDE 5 MG/ML
5 INJECTION, SOLUTION INTRAVENOUS EVERY 5 MIN PRN
Status: DISCONTINUED | OUTPATIENT
Start: 2024-01-02 | End: 2024-01-02

## 2024-01-02 RX ORDER — HYDROCODONE BITARTRATE AND ACETAMINOPHEN 5; 325 MG/1; MG/1
1 TABLET ORAL ONCE AS NEEDED
Status: COMPLETED | OUTPATIENT
Start: 2024-01-02 | End: 2024-01-02

## 2024-01-02 RX ORDER — KETOROLAC TROMETHAMINE 30 MG/ML
INJECTION, SOLUTION INTRAMUSCULAR; INTRAVENOUS
Status: COMPLETED
Start: 2024-01-02 | End: 2024-01-02

## 2024-01-02 RX ORDER — ONDANSETRON 2 MG/ML
INJECTION INTRAMUSCULAR; INTRAVENOUS AS NEEDED
Status: DISCONTINUED | OUTPATIENT
Start: 2024-01-02 | End: 2024-01-02 | Stop reason: SURG

## 2024-01-02 RX ORDER — PHENAZOPYRIDINE HYDROCHLORIDE 100 MG/1
100 TABLET, FILM COATED ORAL 3 TIMES DAILY PRN
Qty: 10 TABLET | Refills: 0 | Status: SHIPPED | OUTPATIENT
Start: 2024-01-02 | End: 2024-01-08 | Stop reason: ALTCHOICE

## 2024-01-02 RX ORDER — HYDROMORPHONE HYDROCHLORIDE 1 MG/ML
0.2 INJECTION, SOLUTION INTRAMUSCULAR; INTRAVENOUS; SUBCUTANEOUS EVERY 5 MIN PRN
Status: DISCONTINUED | OUTPATIENT
Start: 2024-01-02 | End: 2024-01-02

## 2024-01-02 RX ORDER — CEFAZOLIN SODIUM/WATER 2 G/20 ML
SYRINGE (ML) INTRAVENOUS
Status: DISCONTINUED
Start: 2024-01-02 | End: 2024-01-02

## 2024-01-02 RX ORDER — HYDROMORPHONE HYDROCHLORIDE 1 MG/ML
0.4 INJECTION, SOLUTION INTRAMUSCULAR; INTRAVENOUS; SUBCUTANEOUS EVERY 5 MIN PRN
Status: DISCONTINUED | OUTPATIENT
Start: 2024-01-02 | End: 2024-01-02

## 2024-01-02 RX ORDER — HYDROMORPHONE HYDROCHLORIDE 1 MG/ML
0.6 INJECTION, SOLUTION INTRAMUSCULAR; INTRAVENOUS; SUBCUTANEOUS EVERY 5 MIN PRN
Status: DISCONTINUED | OUTPATIENT
Start: 2024-01-02 | End: 2024-01-02

## 2024-01-02 RX ORDER — ACETAMINOPHEN 500 MG
1000 TABLET ORAL ONCE
Status: DISCONTINUED | OUTPATIENT
Start: 2024-01-02 | End: 2024-01-02 | Stop reason: HOSPADM

## 2024-01-02 RX ORDER — LIDOCAINE HYDROCHLORIDE 20 MG/ML
JELLY TOPICAL AS NEEDED
Status: DISCONTINUED | OUTPATIENT
Start: 2024-01-02 | End: 2024-01-02 | Stop reason: HOSPADM

## 2024-01-02 RX ORDER — PROCHLORPERAZINE EDISYLATE 5 MG/ML
INJECTION INTRAMUSCULAR; INTRAVENOUS
Status: COMPLETED
Start: 2024-01-02 | End: 2024-01-02

## 2024-01-02 RX ORDER — NALOXONE HYDROCHLORIDE 0.4 MG/ML
0.08 INJECTION, SOLUTION INTRAMUSCULAR; INTRAVENOUS; SUBCUTANEOUS AS NEEDED
Status: DISCONTINUED | OUTPATIENT
Start: 2024-01-02 | End: 2024-01-02

## 2024-01-02 RX ORDER — ONDANSETRON 2 MG/ML
INJECTION INTRAMUSCULAR; INTRAVENOUS
Status: COMPLETED
Start: 2024-01-02 | End: 2024-01-02

## 2024-01-02 RX ORDER — HYDROCODONE BITARTRATE AND ACETAMINOPHEN 5; 325 MG/1; MG/1
2 TABLET ORAL ONCE AS NEEDED
Status: COMPLETED | OUTPATIENT
Start: 2024-01-02 | End: 2024-01-02

## 2024-01-02 RX ORDER — LIDOCAINE HYDROCHLORIDE 10 MG/ML
INJECTION, SOLUTION EPIDURAL; INFILTRATION; INTRACAUDAL; PERINEURAL AS NEEDED
Status: DISCONTINUED | OUTPATIENT
Start: 2024-01-02 | End: 2024-01-02 | Stop reason: SURG

## 2024-01-02 RX ORDER — HYDROMORPHONE HYDROCHLORIDE 1 MG/ML
INJECTION, SOLUTION INTRAMUSCULAR; INTRAVENOUS; SUBCUTANEOUS
Status: COMPLETED
Start: 2024-01-02 | End: 2024-01-02

## 2024-01-02 RX ORDER — SCOLOPAMINE TRANSDERMAL SYSTEM 1 MG/1
1 PATCH, EXTENDED RELEASE TRANSDERMAL ONCE
Status: DISCONTINUED | OUTPATIENT
Start: 2024-01-02 | End: 2024-01-02 | Stop reason: HOSPADM

## 2024-01-02 RX ORDER — DEXAMETHASONE SODIUM PHOSPHATE 4 MG/ML
VIAL (ML) INJECTION AS NEEDED
Status: DISCONTINUED | OUTPATIENT
Start: 2024-01-02 | End: 2024-01-02 | Stop reason: SURG

## 2024-01-02 RX ADMIN — SODIUM CHLORIDE, SODIUM LACTATE, POTASSIUM CHLORIDE, CALCIUM CHLORIDE: 600; 310; 30; 20 INJECTION, SOLUTION INTRAVENOUS at 10:31:00

## 2024-01-02 RX ADMIN — CEFAZOLIN SODIUM/WATER 2 G: 2 G/20 ML SYRINGE (ML) INTRAVENOUS at 09:23:00

## 2024-01-02 RX ADMIN — DEXAMETHASONE SODIUM PHOSPHATE 8 MG: 4 MG/ML VIAL (ML) INJECTION at 09:29:00

## 2024-01-02 RX ADMIN — LIDOCAINE HYDROCHLORIDE 50 MG: 10 INJECTION, SOLUTION EPIDURAL; INFILTRATION; INTRACAUDAL; PERINEURAL at 09:23:00

## 2024-01-02 RX ADMIN — SODIUM CHLORIDE, SODIUM LACTATE, POTASSIUM CHLORIDE, CALCIUM CHLORIDE: 600; 310; 30; 20 INJECTION, SOLUTION INTRAVENOUS at 09:19:00

## 2024-01-02 RX ADMIN — ONDANSETRON 4 MG: 2 INJECTION INTRAMUSCULAR; INTRAVENOUS at 10:30:00

## 2024-01-02 NOTE — ANESTHESIA PROCEDURE NOTES
Airway  Date/Time: 1/2/2024 9:25 AM  Urgency: elective    Airway not difficult    General Information and Staff    Patient location during procedure: OR  Anesthesiologist: Bunny Sawyer MD  Resident/CRNA: Evelio Thomas CRNA  Performed: CRNA   Performed by: Evelio Thomas CRNA  Authorized by: Bunny Sawyer MD      Indications and Patient Condition  Indications for airway management: anesthesia  Spontaneous Ventilation: absent  Sedation level: deep  Preoxygenated: yes  Patient position: sniffing  Mask difficulty assessment: 1 - vent by mask    Final Airway Details  Final airway type: supraglottic airway      Successful airway: classic  Size 4       Number of attempts at approach: 1  Number of other approaches attempted: 0    Additional Comments  Dentition per pre op

## 2024-01-02 NOTE — DISCHARGE INSTRUCTIONS
You had cystoscopy, ureteroscopy, and stent placement in the operating room today.    Instructions:    - No heavy lifting or strenuous activity for 1 day. You may resume regular activity tomorrow.       - Your follow-up appointment is listed below. Please contact us at [phone] if you need to change your appointment.     Your appointments       Date & Time Appointment Department ([place])    Jan 08, 2024  9:00 AM CST Procedure with Paul Zapata MD [facility] ([place] [address])        Jan 23, 2024 12:30 PM CST Follow up with Mike Phillips LCSW [facility] ([facility] )        Feb 06, 2024 12:30 PM CST Follow up with Mike Phillips LCSW [facility] ([facility] )        Feb 20, 2024 12:30 PM CST Follow up with Mike Phillips LCSW [facility] ([facility] )        Feb 27, 2024 10:30 AM CST Follow up with Monik Lopez APN [facility] ([facility] )        May 10, 2024  8:45 AM CDT Established Patient with Pascual Palacios MD [facility] ([facility])              [facility] [address]  [address]  [facility] [id number] [phone] [place]  [facility]  [address]  [facility] [address]  [phone] [facility]   [address] [facility] [address] [phone]    [facility]   [address]  [facility] [address] [phone]             -  You have a stent (small plastic tube) inside your kidney and ureter to allow the swelling from surgery to resolve. This is only temporary and must be removed, so you should not forget about it. We will remove your stent via a brief cystoscopy in  clinic when you return. If you have to miss this appointment for some reason please make sure you re-schedule it.        - With a ureteral stent in place you may have some discomfort on your side/flank. You can feel pain worsen when you urinate, so try to avoid holding urine and void every 2-3 hours. You also may notice increased blood in the urine as you increase your activity. If this happens increase your hydration and take it easy for a day. Warm baths/hot packs can help with the discomfort as well as your prescribed medications and Advil/Motrin (if you are able to take these).     - You may experience mild pain after the procedure for a few days.  If the pain becomes intolerable please contact our office or go to the nearest Emergency Room or Urgent Care. You should take over the counter ibuprofen (AKA motrin, advil) for mild pain (provided you do not have a medical condition such as stomach ulcers or kidney disease which prohibits you from taking these). You may alternate this with tylenol as well. If pain is still not relieved by tylenol and/or ibuprofen, you may take narcotic pain medication if prescribed (typically oxycodone or tramadol). If you are taking narcotic pain medication this can make you constipated, so you should take over the counter stool softeners or miralax if prescribed.  Lastly given Toradol at 1230pm. Next dose of Ibuprofen can be after 630pm   Lastly given Norco at 1230pm.  Next dose of Tylenol can be after 630pm.    Alternate Tylenol and Ibuprofen.     - Warm pack or hot baths often help with discomfort after cystoscopy.    - If you take blood thinners (such as aspirin or plavix) please hold these medications until 2 days after surgery.     - You may experience burning and frequency of urination over the next few days. This will improve after a few days if you stay well hydrated. If you were prescribed phenazopyridine (Pyridium) this may relieve urinary discomfort but you can only take  this for 3 days. Pyridium will make your urine orange.     - You are likely to see some blood in your urine (pink or light red urine) that should clear up within a few days. Staying well hydrated should help this clear up. If you notice the urine stays dark red or there are multiple large blood clots despite good hydration, please call the urology clinic ([phone]).     - Try to abstain from alcohol, coffee, tea, artificial sweeteners, and spicy food for the next 48 hours as these can irritate the bladder.     - If you develop fevers / chills, difficulty urinating, or abdominal pain that does not improve with pain medications, please call the office.     - Drink 1.5 to 2 liters of fluid today (water is preferable). If you are on a fluid restriction due to other medical reasons then you need to adhere to your fluid restriction recommendations.      Paul Zapata MD  Staff Urologist  [facility]  Office: [phone]

## 2024-01-02 NOTE — ANESTHESIA PREPROCEDURE EVALUATION
PRE-OP EVALUATION    Patient Name: Werner Parks    Admit Diagnosis: Nephrolithiasis [N20.0]    Pre-op Diagnosis: Nephrolithiasis [N20.0]    CYSTOSCOPY, LEFT URETEROSCOPY, LASER LITHOTRIPSY, STENT PLACEMENT    Anesthesia Procedure: CYSTOSCOPY, LEFT URETEROSCOPY, LASER LITHOTRIPSY, STENT PLACEMENT (Left: Ureter)    Surgeon(s) and Role:     * Paul Zapata MD - Primary    Pre-op vitals reviewed.  Temp: 97.1 °F (36.2 °C)  Pulse: 73  Resp: 16  BP: 119/61  SpO2: 99 %  Body mass index is 26.88 kg/m².    Current medications reviewed.  Hospital Medications:   [MAR Hold] acetaminophen (Tylenol Extra Strength) tab 1,000 mg  1,000 mg Oral Once    [MAR Hold] scopolamine (Transderm-Scop) 1 MG/3DAYS patch 1 patch  1 patch Transdermal Once    lactated ringers infusion   Intravenous Continuous    ceFAZolin (Ancef) 2 g in 20mL IV syringe premix  2 g Intravenous Once    ceFAZolin (Ancef) 2 g/20mL IV syringe premix           Outpatient Medications:     Medications Prior to Admission   Medication Sig Dispense Refill Last Dose    DULoxetine 20 MG Oral Cap DR Particles Take 1 capsule (20 mg total) by mouth 2 (two) times daily. 60 capsule 0 1/2/2024    Ciclopirox 8 % External Solution Apply 1 Application topically nightly. Apply to toenail 6 mL 3 Past Month    neomycin-polymyxin-dexamethasone 3.5-05667-4.1 Ophthalmic Ointment Place 1 Application  into both eyes 3 (three) times daily. (Patient taking differently: Place 1 Application  into both eyes as needed.) 3.5 g 3 Past Week    doxycycline 100 MG Oral Cap Take 1 capsule (100 mg total) by mouth daily. 90 capsule 1 1/1/2024    clotrimazole-betamethasone 1-0.05 % External Cream Apply 1 Application topically 2 (two) times daily. Apply to affected area for 10-14 days, then stop. Shower/clean area daily.  If disorder recurs in the future, please restart medication 45 g 1 Past Month    Potassium Citrate ER (UROCIT-K 15) 15 MEQ (1620 MG) Oral Tab CR Take 1 tablet by mouth in the  morning and 1 tablet before bedtime. 90 tablet 5 1/1/2024    Cyanocobalamin (VITAMIN B 12) 500 MCG Oral Tab Take 1 tablet by mouth daily.   1/1/2024    Coenzyme Q10 100 MG Oral Cap 1 capsule with a meal Orally Once a day   1/1/2024    levothyroxine 50 MCG Oral Tab 1 tablet in the morning on an empty stomach Orally Once a day for 90 days   1/2/2024    Multiple Vitamin (MULTI VITAMIN) Oral Tab Take 1 tablet by mouth daily.   1/1/2024    polypropylene glycol- (SYSTANE) 0.4-0.3 % Ophthalmic Solution Place 1 drop into both eyes as needed.   Past Week    tadalafil 5 MG Oral Tab take 1 tablet by mouth daily as needed 1 hour prior to intercourse as directed Orally as directed for 90 days       Meloxicam 15 MG Oral Tab Take 1 tablet (15 mg total) by mouth daily. (Patient taking differently: Take 1 tablet (15 mg total) by mouth as needed.) 30 tablet 0 12/22/2023    simvastatin 20 MG Oral Tab Take 1 tablet (20 mg total) by mouth every evening.   1/1/2024    cyclobenzaprine 5 MG Oral Tab 1 tablet (5 mg total) 3 (three) times daily as needed.       hydrocortisone 2.5 % External Ointment Apply 1 Application topically 2 (two) times daily. (Patient taking differently: Apply 1 Application  topically as needed.) 20 g 3     loratadine 10 MG Oral Tab Take 1 tablet (10 mg total) by mouth daily.   1/1/2024       Allergies: Sulfamethoxazole w/trimethoprim      Anesthesia Evaluation    Patient summary reviewed.    Anesthetic Complications  (-) history of anesthetic complications         GI/Hepatic/Renal    Negative GI/hepatic/renal ROS.                             Cardiovascular        Exercise tolerance: good     MET: >4         (+) hyperlipidemia                                  Endo/Other    Negative endo/other ROS.                              Pulmonary      (+) asthma                     Neuro/Psych      (+) depression                                Past Surgical History:   Procedure Laterality Date    COLONOSCOPY  7-31-12  [facility]    COLONOSCOPY      ORAL SURGERY PROCEDURE      [place] Teeth    OTHER SURGICAL HISTORY      varicose vein to left testicle    OTHER SURGICAL HISTORY      sinus sx    OTHER SURGICAL HISTORY  03/21/2019    Cysto/ Stent Removal [facility]    TONSILLECTOMY       Social History     Socioeconomic History    Marital status:    Tobacco Use    Smoking status: Former     Types: Cigarettes     Passive exposure: Past (1980s-1990s from ex wife)    Smokeless tobacco: Former    Tobacco comments:     Social 5 cigarettes/week   Vaping Use    Vaping Use: Never used   Substance and Sexual Activity    Alcohol use: Yes     Comment: occasionally    Drug use: No     History   Drug Use No     Available pre-op labs reviewed.     Lab Results   Component Value Date     11/17/2023    K 4.1 11/17/2023     11/17/2023    CO2 29.0 11/17/2023    BUN 11 11/17/2023    CREATSERUM 0.94 11/17/2023    GLU 76 11/17/2023    CA 9.2 11/17/2023            Airway      Mallampati: II  Mouth opening: >3 FB  TM distance: 4 - 6 cm  Neck ROM: full Cardiovascular    Cardiovascular exam normal.         Dental             Pulmonary    Pulmonary exam normal.                 Other findings              ASA: 2   Plan: general  NPO status verified and patient meets guidelines.          Plan/risks discussed with: patient                Present on Admission:  **None**

## 2024-01-02 NOTE — OPERATIVE REPORT
UROLOGY OPERATIVE NOTE    DATE: 1/2/2024    PATIENT: Werner Parks  AGE: 63 year old  SEX: male  MRN: [id number]  YOB: 1960    PROCEDURE:  1. Cystoscopy, rigid (35901)  2. left retrograde pyelogram (71391)  3. left ureteroscopy with laser lithotripsy (46398)  4. Stone basketing (71596)  5. left ureteral stent placement ; 6x28 JJ    SURGEON: Paul Zapata MD     PREOPERATIVE DIAGNOSIS: left nephrolithiasis    POSTOPERATIVE DIAGNOSIS: Same    FINDINGS:   Normal urethral. Mildly enlarged prostate. No bladder lesions. Left RPG with radioopaque stones in lower pole; no hydronephrosis. Ureteroscopy with ~5 and ~8mm stones in lower pole; basketed to upper pole and fragmented. Additional stones in upper pole and narrow interpolar calyx both basket extracted (each ~3mm in size). All fragments extracted. No remaining stone. Mild nephrocalcinosis noted.   6x28 JJ stent placed without issue.     ANESTHESIA: General    BLOOD LOSS: Minimal    COMPLICATIONS: None    DRAINS:       Left 6x28 JJ stent     IMPLANTS:  Implant Name Type Inv. Item Serial No.  Lot No. LRB No. Used Action   ASCERTA STENT URET 28CM 6FR - SNA  ASCERTA STENT URET 28CM 6FR NA Stuffle WD [id number] Left 1 Implanted        SPECIMENS:   Left kidney stone     INDICATIONS FOR OPERATIVE PROCEDURE:      Werner Parks is a 63 year old male with history of HLD referred for nephrolithiasis.     Has followed with Dr. Adamson for many years. Has passed many stones spontaneously.   Had URS on left side 2019, stone was CaOx.   24hr urine was low citrate, high oxalate.      Had a AGUSTÍN 11/2021 per PCP which showed a left sided 4mm stone. Then established with me in 10/12/23.   Reported good stone diet. No systemic symptoms. No voiding issues.   Ua was negative.      CTU done:   Stones are present left kidney, 2 located in the lower pole, 1 measuring 7 x 5 mm, another 5 x 5 mm.  Smaller stone left mid pole 3 x 3  mm.  Also small puntate right sided stone.   His 24 hour urine test revealed low citrate (292).   Blood work normal.   Considering urocit k once surgery completed.      He also has recent balanitis/phimosis-   He is interested in circumcision in the future as he states cream did not help; he wants to wait until after kidney stones are managed.      He now presents for LEFT sided stone management. Options for observation, PCNL, URS, ESWL all discussed. Based on the number, size, and location of his stone(s), I believe ureteroscopy with holmium laser lithotripsy is an appropriate treatment option and is in line with his goals. We discussed the risks of infection, bleeding, damage to the ureter with subsequent stricture formation, and the need for additional procedures. I told him that, in rare circumstances, we are unable to advance the ureteroscope into the ureter and would be formed to simple place a ureteral stent and attempt ureteroscopy again after two weeks of passive dilation. We also discussed the possibility of post-op stent placement, even when the procedure is successfully performed, in order to encourage proper healing and prevent scar formation.       DESCRIPTION OF OPERATIVE PROCEDURE:  Prior to induction of anesthesia the patient was placed in bilateral stocking compression devices as well as given preoperative IV antibiotics. Once adequate anesthesia was obtained, he was positioned in dorsal lithotomy position with all pressure points padded. Genitals were prepped and draped in standard sterile fashion. A surgical timeout was performed confirming patient identification and procedure.    A 21-Italian scope and 30-degree lens was inserted in the patient's urethra and advanced towards his bladder. Systematic inspection of the bladder revealed no bladder mass, lesions, or stones. We turned our attention to the  left ureteral orifice and performed a retrograde pyelogram  by cannulating the ureteral orifice  with a 5-[place]ian ureteral catheter. Contrast dye was slowly injected. A sensor guidewire was placed through the ureteral catheter and into the patient's left renal pelvis. Position was confirmed with fluoroscopy. Cystoscope and  catheter were removed.    A 8x10 coaxial ureteral dilator was then advanced over the wire to dilate the distal ureter and introduce another sensor guidewire for a total of 2 wires in the patient's left renal pelvis. Again, position was confirmed with fluoroscopy. The dilator was then removed and one wire was secured to the drapes as safety wire. The other was our working wire.      Over our working wire we gently introduced a 11/13F x 36cm ureteral access sheath under fluoroscopic visualization to the level of the proximal ureter. This passed easily. Once that was done, we then removed the inner sheath and wire.  We then performed left ureteroscopy using a flexible ureteroscope. A systematic inspection of the kidney revealed:   ~5 and ~8mm stones in lower pole; basketed to upper pole and fragmented using 200 micron holmium laser fiber. Additional stones in upper pole and narrow interpolar calyx both basket extracted (each ~3mm in size). All fragments were removed using a ZeroTip nitinol basket and these were sent for chemical analysis. We shot another retrograde pyelogram through the ureteroscope to delineate the collecting system. Once we had ensured we had examined every matt and removed all significant stone fragments, we then removed our ureteroscope and ureteral sheath, making sure to inspect the ureter on the way out.  There was no damage to the ureter. We then placed a 6x28 double-J ureteral stent over our safety wire using a Seldinger technique under cystoscopic vision. Stent left without strings.The bladder was then drained.     The patient tolerated the procedure well with no immediate complications. All sponge and instrument counts were correct at the end of the case. Patient was  extubated and transferred to the PACU in stable condition.    I was present and scrubbed for entirety of the procedure.     DISPOSITION:  - Discharge to home after recovery in PACU  - RTC next week for stent removal   - Abx x3 days  - RBUS in 6 weeks, consider K citrate given previous stone analysis/urorisk        Paul Zapata MD  Staff Urologist  [facility]  Office: [phone]

## 2024-01-02 NOTE — ANESTHESIA POSTPROCEDURE EVALUATION
[facility] Patient Status:  Hospital Outpatient Surgery   Age/Gender 63 year old male MRN [id number]   Location [facility] SURGERY Attending Paul Zapata MD   Hosp Day # 0 PCP Elif Aguilera MD       Anesthesia Post-op Note    CYSTOSCOPY, LEFT URETEROSCOPY, LASER LITHOTRIPSY, STENT PLACEMENT    Procedure Summary       Date: 01/02/24 Room / Location:  MAIN OR 07 / [facility] MAIN OR    Anesthesia Start: 0919 Anesthesia Stop: 1046    Procedure: CYSTOSCOPY, LEFT URETEROSCOPY, LASER LITHOTRIPSY, STENT PLACEMENT (Left: Ureter) Diagnosis:       Nephrolithiasis      (Nephrolithiasis [N20.0])    Surgeons: Paul Zapata MD Anesthesiologist: Bunny Sawyer MD    Anesthesia Type: general ASA Status: 2            Anesthesia Type: general    Vitals Value Taken Time   /95 01/02/24 1047   Temp 97.6 01/02/24 1047   Pulse 81 01/02/24 1047   Resp 16 01/02/24 1047   SpO2 96 01/02/24 1047       Patient Location: PACU    Anesthesia Type: general    Airway Patency: patent and extubated    Postop Pain Control: adequate    Mental Status: preanesthetic baseline    Nausea/Vomiting: none    Cardiopulmonary/Hydration status: stable euvolemic    Complications: no apparent anesthesia related complications    Postop vital signs: stable    Dental Exam: Unchanged from Preop    Patient to be discharged from PACU when criteria met.

## 2024-01-04 ENCOUNTER — TELEPHONE (OUTPATIENT)
Dept: SURGERY | Facility: CLINIC | Age: 64
End: 2024-01-04

## 2024-01-08 ENCOUNTER — PROCEDURE (OUTPATIENT)
Dept: SURGERY | Facility: CLINIC | Age: 64
End: 2024-01-08
Payer: COMMERCIAL

## 2024-01-08 ENCOUNTER — LAB ENCOUNTER (OUTPATIENT)
Dept: LAB | Age: 64
End: 2024-01-08
Attending: UROLOGY
Payer: COMMERCIAL

## 2024-01-08 VITALS — HEART RATE: 78 BPM | SYSTOLIC BLOOD PRESSURE: 127 MMHG | DIASTOLIC BLOOD PRESSURE: 82 MMHG

## 2024-01-08 DIAGNOSIS — N20.0 KIDNEY STONE: ICD-10-CM

## 2024-01-08 DIAGNOSIS — N20.0 KIDNEY STONE: Primary | ICD-10-CM

## 2024-01-08 LAB
ANION GAP SERPL CALC-SCNC: 4 MMOL/L (ref 0–18)
APPEARANCE: CLEAR
BILIRUBIN: NEGATIVE
BUN BLD-MCNC: 14 MG/DL (ref 9–23)
CALCIUM BLD-MCNC: 9 MG/DL (ref 8.5–10.1)
CHLORIDE SERPL-SCNC: 107 MMOL/L (ref 98–112)
CO2 SERPL-SCNC: 29 MMOL/L (ref 21–32)
CREAT BLD-MCNC: 0.97 MG/DL
EGFRCR SERPLBLD CKD-EPI 2021: 88 ML/MIN/1.73M2 (ref 60–?)
FASTING STATUS PATIENT QL REPORTED: NO
GLUCOSE (URINE DIPSTICK): NEGATIVE MG/DL
GLUCOSE BLD-MCNC: 69 MG/DL (ref 70–99)
KETONES (URINE DIPSTICK): NEGATIVE MG/DL
MULTISTIX LOT#: ABNORMAL NUMERIC
NITRITE, URINE: NEGATIVE
OSMOLALITY SERPL CALC.SUM OF ELEC: 289 MOSM/KG (ref 275–295)
PH, URINE: 7 (ref 4.5–8)
POTASSIUM SERPL-SCNC: 3.9 MMOL/L (ref 3.5–5.1)
PROTEIN (URINE DIPSTICK): 30 MG/DL
SODIUM SERPL-SCNC: 140 MMOL/L (ref 136–145)
SPECIFIC GRAVITY: 1.01 (ref 1–1.03)
URINE-COLOR: YELLOW
UROBILINOGEN,SEMI-QN: 0.2 MG/DL (ref 0–1.9)

## 2024-01-08 PROCEDURE — 36415 COLL VENOUS BLD VENIPUNCTURE: CPT

## 2024-01-08 PROCEDURE — 80048 BASIC METABOLIC PNL TOTAL CA: CPT

## 2024-01-08 PROCEDURE — 3074F SYST BP LT 130 MM HG: CPT | Performed by: UROLOGY

## 2024-01-08 PROCEDURE — 3079F DIAST BP 80-89 MM HG: CPT | Performed by: UROLOGY

## 2024-01-08 PROCEDURE — 52310 CYSTOSCOPY AND TREATMENT: CPT | Performed by: UROLOGY

## 2024-01-08 PROCEDURE — 81003 URINALYSIS AUTO W/O SCOPE: CPT | Performed by: UROLOGY

## 2024-01-08 NOTE — PROGRESS NOTES
Clinic Procedure Note    INDICATIONS:   Werner Parks is a 63 year old male with history HLD and NL sp ureteroscopy and ureteral stent placement 1/2/24 presenting for cystoscopy/stent removal.    Has followed with Dr. Adamson for many years. Has passed many stones spontaneously.   Had URS on left side 2019, stone was CaOx.   24hr urine was low citrate, high oxalate.      Had a AGUSTÍN 11/2021 per PCP which showed a left sided 4mm stone. Then established with me in 10/12/23.   Reported good stone diet. No systemic symptoms. No voiding issues.   UA was negative.      CTU done:   Stones are present left kidney, 2 located in the lower pole, 1 measuring 7 x 5 mm, another 5 x 5 mm.  Smaller stone left mid pole 3 x 3 mm.  Also small puntate right sided stone.   His 24 hour urine test revealed low citrate (292).   Blood work normal.   He was started on K citrate 11/17/23.      He also has recent balanitis/phimosis-   He is interested in circumcision in the future as he states cream did not help; he wants to wait until after kidney stones are managed.       Presented for left URS on 1/3/24.     FINDINGS:  Normal urethra. Mildly enlarged prostate. No bladder lesions. Left RPG with radioopaque stones in lower pole; no hydronephrosis. Ureteroscopy with ~5 and ~8mm stones in lower pole; basketed to upper pole and fragmented. Additional stones in upper pole and narrow interpolar calyx both basket extracted (each ~3mm in size). All fragments extracted. No remaining stone. Mild nephrocalcinosis noted.   6x28 JJ stent placed without issue.     Stone: pending    Possible punctate stone on the right, observe.     He is doing well today. No complaints.       PROCEDURE:       1. Flexible cystoscopy, removal of left ureteral stent (04657)    DATE OF PROCEDURE: 1/7/2024     PRE-PROCEDURE DIAGNOSIS: Nephrolithiasis    POST-PROCEDURE DIAGNOSIS: Same     SURGEON: Paul Zapata MD    FINDINGS:  Stent successfully removed in its  entirety.     PROCEDURE:   Patient was brought to the procedure suite and a time-out was performed identifiying the patient,  and procedure to be performed. The risks and benefits of the procedure were once again discussed with the patient including bleeding, infection, and dysuria. The patient agreed to proceed. The patient did not have any signs or symptoms of active UTI. Prophylactic PO antibiotics were given in clinic.    The patient was placed in supine position on the table and the genital area was prepped and draped in the standard sterile fashion. Urojet was used prior for local anesthesia effect. A flexible cystoscope was inserted per urethra. There were no urethral strictures present. The bladder was entered and the distal coil of the stent was seen protruding from the ureteral orifice. The stent was grasped with the flexible stent grasper, and then the stent and cystoscope were both removed. The stent was visualized outside the body and confirmed to be intact and fully removed.     There were no complications and the patient tolerated the procedure well.    IMPRESSION:  Successful stent removal after ureteroscopy today.    PLAN:   -CT in 6 weeks to ensure no silent hydronephrosis or significant stone burden   -Continue K citrate- repeat BMP today to ensure normal K   -Follow up stone path; previous CaOx  Recheck urorisk in 6 weeks     Return in 2 mo     Paul Zapata MD  Staff Urologist  [facility]  Office: [phone]

## 2024-01-09 DIAGNOSIS — G89.29 CHRONIC LEFT-SIDED LOW BACK PAIN WITH LEFT-SIDED SCIATICA: ICD-10-CM

## 2024-01-09 DIAGNOSIS — R29.2 ABSENT LEFT PATELLAR REFLEX: ICD-10-CM

## 2024-01-09 DIAGNOSIS — M54.42 CHRONIC LEFT-SIDED LOW BACK PAIN WITH LEFT-SIDED SCIATICA: ICD-10-CM

## 2024-01-09 NOTE — TELEPHONE ENCOUNTER
Last OV: 9/18/23  Last refill date: 8/5/23     #/refills: 30/0  Upcoming appt: None    Pts update:  \"I’m having a tendinosis of the right foot’s Achiles tendon. The inner side mostly. Meloxicam has helped me reduce the inflammation, with some therapy. However, last week I had kidney stones removed and a medication the doctor prescribed (oxybutinin, for bladder spasms), which I stopped taking 12 hr before removal of the catheter yesterday, may be causing that I now have painful spasms in my back. I do have a bulging disk on the left side of L4 that has caused my sciatic nerve to be irritated, but I feel pain on both sides of my lower back.\"

## 2024-01-10 LAB
CAOX DIHYDRATE: 20 %
CAOX MONOHYDRATE: 80 %
WEIGHT-STONE: 121 MG

## 2024-01-10 RX ORDER — MELOXICAM 15 MG/1
15 TABLET ORAL DAILY
Qty: 30 TABLET | Refills: 0 | OUTPATIENT
Start: 2024-01-10

## 2024-01-10 NOTE — TELEPHONE ENCOUNTER
Meloxicam  DOS: N/A  Last OV: 09/18/23   Last refill date: 08/15/23     #/refills: 30/0  Upcoming appt:   Future Appointments   Date Time Provider Department Center   1/23/2024 12:30 PM Mike Phillips LCSW [facility]   2/6/2024 12:30 PM Mike Phillips LCSW [facility]   2/20/2024 12:30 PM Mike Phillips LCSFELICIANO [facility]   2/27/2024 10:30 AM Monik Lopez APN [facility]   5/10/2024  8:45 AM Pascual Palacios MD [facility]     1/8/24  BUN  9 - 23 mg/dL 14   Creatinine  0.70 - 1.30 mg/dL 0.97     eGFR-Cr  >=60 mL/min/1.73m2 88     Per LOV: \"With significant relief of pain with time and Meloxicam, we agree to remain with continued conservative care.\"

## 2024-02-17 ENCOUNTER — LAB ENCOUNTER (OUTPATIENT)
Dept: LAB | Facility: HOSPITAL | Age: 64
End: 2024-02-17
Attending: UROLOGY
Payer: COMMERCIAL

## 2024-02-17 DIAGNOSIS — N20.0 KIDNEY STONE: ICD-10-CM

## 2024-02-17 PROCEDURE — 84560 ASSAY OF URINE/URIC ACID: CPT

## 2024-02-17 PROCEDURE — 36415 COLL VENOUS BLD VENIPUNCTURE: CPT

## 2024-02-17 PROCEDURE — 83935 ASSAY OF URINE OSMOLALITY: CPT

## 2024-02-17 PROCEDURE — 84133 ASSAY OF URINE POTASSIUM: CPT

## 2024-02-17 PROCEDURE — 84105 ASSAY OF URINE PHOSPHORUS: CPT

## 2024-02-17 PROCEDURE — 84300 ASSAY OF URINE SODIUM: CPT

## 2024-02-17 PROCEDURE — 83945 ASSAY OF OXALATE: CPT

## 2024-02-17 PROCEDURE — 83986 ASSAY PH BODY FLUID NOS: CPT

## 2024-02-17 PROCEDURE — 82507 ASSAY OF CITRATE: CPT

## 2024-02-17 PROCEDURE — 82570 ASSAY OF URINE CREATININE: CPT

## 2024-02-17 PROCEDURE — 82340 ASSAY OF CALCIUM IN URINE: CPT

## 2024-02-17 PROCEDURE — 83735 ASSAY OF MAGNESIUM: CPT

## 2024-02-17 PROCEDURE — 84392 ASSAY OF URINE SULFATE: CPT

## 2024-02-17 PROCEDURE — 82436 ASSAY OF URINE CHLORIDE: CPT

## 2024-02-27 ENCOUNTER — TELEPHONE (OUTPATIENT)
Dept: PHYSICAL THERAPY | Facility: HOSPITAL | Age: 64
End: 2024-02-27

## 2024-02-27 ENCOUNTER — OFFICE VISIT (OUTPATIENT)
Dept: PHYSICAL THERAPY | Age: 64
End: 2024-02-27
Attending: PODIATRIST
Payer: COMMERCIAL

## 2024-02-27 DIAGNOSIS — M67.873 ACHILLES TENDINOSIS OF RIGHT ANKLE: Primary | ICD-10-CM

## 2024-02-27 PROCEDURE — 97140 MANUAL THERAPY 1/> REGIONS: CPT

## 2024-02-27 PROCEDURE — 97161 PT EVAL LOW COMPLEX 20 MIN: CPT

## 2024-02-28 NOTE — PROGRESS NOTES
LOWER EXTREMITY EVALUATION:     Diagnosis:   Achilles tendinosis of right ankle (M67.873)       Referring Provider: Yecenia  Date of Evaluation:    2/27/2024    Precautions:  None Next MD visit:   none scheduled  Date of Surgery: n/a     PATIENT SUMMARY   Werner Parks is a 63 year old male who presents to therapy today with complaints of pain in the R achilles. Pt was being seen last year and was doing well. There was a gap in his treatment and he is feeling increased pain in the R achilles with running to catch the train and when on his feet for prolonged periods of time. Pt has been continuing with his exercises. Please see below for further details   Pt describes pain level current 2/10. Increased with standing or having to run to catch his train. Pt does still continue to ice as needed. No pain in the am when her first gets up  Current functional limitations include walking for longer then community distances,  running, standing for prolonged periods of time, .     Werner describes prior level of function I. Pt goals include decrease the R achilles pain .  Past medical history was reviewed with Werner. Significant findings include   Past Medical History:   Diagnosis Date    Back problem     Calculus of kidney     Depression     Extrinsic asthma, unspecified     Hearing loss     High cholesterol     History of COVID-19 05/2023    Had cough, \"flu like\"  S/S x 1 week. No hospitalization.    Other and unspecified hyperlipidemia     Visual impairment     glasses    Wears glasses         ASSESSMENT  Werner presents to physical therapy evaluation with primary c/o R achilles pain . The results of the objective tests and measures show increased edema and thickening of the R achilles, pain with STM today .  Functional deficits include but are not limited to walking for prolonged periods of time .  Signs and symptoms are consistent with diagnosis of chronic achilles tendinosis . Pt and PT discussed  evaluation findings, pathology, POC and HEP.  Pt voiced understanding and performs HEP correctly without reported pain. Skilled Physical Therapy is medically necessary to address the above impairments and reach functional goals.     OBJECTIVE:   Observation: pt does demonstrate a slight decrease stance time on the R   Palpation: very tender today upon starting STM on the R   Sensation: no report of numbness or tingling     AROM: (* denotes performed with pain)  Hip Knee Foot/Ankle   Flexion: R 120; L 120  Extension: R WFL; L WFL  Abduction: R 45; L 45  ER: R NA; L NA  IR: R NA; L NA 0-120 degs B   DF: R 25; L 25  PF: R 40; L 40  INV: R 15; L 15  EV: R 25; L 25  Great toe ext: R 70; L 70     Accessory motion: no loss of movement in the TCJ or subtalar R    Flexibility:  Hip Flexor: R min, L min  Hamstrings: R min; L min  Piriformis: R min; L min  Quads: R min; L min  Gastroc-soleus: R min; L min    Strength/MMT: (* denotes performed with pain)  Hip Knee Foot/Ankle   Flexion: R 5/5; L 5/5  Extension: R 5/5; L 5/5  Abduction: R 5/5; L 5/5  ER: R 5/5; L 5/5  IR: R 5/5; L 5/5 Flexion: R 5/5; L 5/5  Extension: R 5/5; L 5/5     Pt can toe and heel walk      Special tests:   NA    Gait: pt ambulates on level ground with no assist   Balance: SLS: R 15 sec, L 20 sec    Today’s Treatment and Response: STM and tool for the R achilles. Review of HEP ( stretches, heel raises, rocker board ) game ready R 15 mins 34 degs   Pt education was provided on exam findings, treatment diagnosis, treatment plan, expectations, and prognosis. Pt was also provided recommendations for possible soreness after evaluation.  Patient was instructed in and issued a HEP for: ice as needed. See above     Charges: PT Eval Low Complexity, MT 1       Total Timed Treatment: 45 min     Total Treatment Time: 45 min     Based on clinical rationale and outcome measures, this evaluation involved Low Complexity decision making due to 1-2 personal  factors/comorbidities, 3 body structures involved/activity limitations, and evolving symptoms including changing pain levels.  PLAN OF CARE:    Goals: (to be met in 8 visits)  Pt to report walking for 1 mile without increase in symptoms R achilles  Pt will report standing for 45 mins without increase in pain R achilles  Pt will demo SL balance 30 secs B     Frequency / Duration: Patient will be seen for 2 x/week or a total of 8 visits over a 90 day period. Treatment will include: manual work for the achilles on the R   Balance and hip/ ankle strengthening     Education or treatment limitation: None  Rehab Potential:good    FES Score  No data recorded  No data recorded    Patient/Family/Caregiver was advised of these findings, precautions, and treatment options and has agreed to actively participate in planning and for this course of care.    Thank you for your referral. Please co-sign or sign and return this letter via fax as soon as possible to [phone]. If you have any questions, please contact me at Dept: [phone]    Sincerely,  Electronically signed by therapist: Meera Winslow, PT  Physician's certification required: Yes  I certify the need for these services furnished under this plan of treatment and while under my care.    X___________________________________________________ Date____________________    Certification From: 2/27/2024  To:5/27/2024

## 2024-03-07 ENCOUNTER — APPOINTMENT (OUTPATIENT)
Dept: PHYSICAL THERAPY | Age: 64
End: 2024-03-07
Attending: PODIATRIST
Payer: COMMERCIAL

## 2024-03-07 ENCOUNTER — OFFICE VISIT (OUTPATIENT)
Dept: PHYSICAL THERAPY | Age: 64
End: 2024-03-07
Attending: PODIATRIST
Payer: COMMERCIAL

## 2024-03-07 PROCEDURE — 97140 MANUAL THERAPY 1/> REGIONS: CPT

## 2024-03-07 PROCEDURE — 97110 THERAPEUTIC EXERCISES: CPT

## 2024-03-07 NOTE — PROGRESS NOTES
Diagnosis:   Achilles tendinosis of right ankle (M67.873)       Referring Provider: Abilio  Date of Evaluation:    2/27/24    Precautions:  None Next MD visit:   none scheduled  Date of Surgery: n/a   Ins[place] Primary/Secondary[place] / N/[place]     # Auth Visits: 5 visits 2/27-5/27          Subjective: Can still feel pain walking quickly to the train. Stairs typically ok.  Was taking Diclofenac.  Has not played soccer since last summer.     Pain: 1/10      Objective:   (+) tenderness/thickening R Achilles      Assessment: Pt is following through with stretches and wearing 1 layer heel lift.  Good tolerance for STM; no c/o pain post session.       Goals:    (to be met in 8 visits)  Pt to report walking for 1 mile without increase in symptoms R achilles  Pt will report standing for 45 mins without increase in pain R achilles  Pt will demo SL balance 30 secs B     Plan: Patient will be seen for 2 x/week or a total of 8 visits over a 90 day period. Treatment will include: manual work for the achilles on the R   Balance and hip/ ankle strengthening   Date: 3/7/2024  TX#: 2/5 Date:                 TX#: 3/ Date:                 TX#: 4/ Date:                 TX#: 5/ Date:   Tx#: 6/     Manual   STM/IASTM R Achilles/gastroc       Talocrual mobes inv/ever GR III, IV       TE  Gastroc and soleus stretches 30\" x3 each  DL heel raise/eccen control 20x  Ice massage Achilles              HEP: gastroc, soleus stretches. DLHR    Charges: man x2 25  TE x 1 20'       Total Timed Treatment: 45 min  Total Treatment Time: 45 min

## 2024-03-19 ENCOUNTER — OFFICE VISIT (OUTPATIENT)
Dept: PHYSICAL THERAPY | Age: 64
End: 2024-03-19
Attending: PODIATRIST
Payer: COMMERCIAL

## 2024-03-19 PROCEDURE — 97140 MANUAL THERAPY 1/> REGIONS: CPT

## 2024-03-19 PROCEDURE — 97110 THERAPEUTIC EXERCISES: CPT

## 2024-03-19 NOTE — PROGRESS NOTES
Diagnosis:   Achilles tendinosis of right ankle (M67.873)       Referring Provider: Abilio  Date of Evaluation:    2/27/24    Precautions:  None Next MD visit:   none scheduled  Date of Surgery: n/a   [place] / N[place]     # Auth Visits: 5 visits 2/27-5/27          Subjective:  better, overall unless I squeeze the area. Better after the last appt with the STM. Not taking the meds anymore for inflammation.     Pain: 1/10      Objective:   (+) tenderness/thickening R Achilles      Assessment:  no c/o pain post session.  Advised to remove the one layer lift at this time. Pt will not wear it to work tomorrow, just his orthotics.   Improved ankle ROM       Goals:    (to be met in 8 visits)  Pt to report walking for 1 mile without increase in symptoms R achilles  Pt will report standing for 45 mins without increase in pain R achilles  Pt will demo SL balance 30 secs B     Plan:   Date: 3/7/2024  TX#: 2/5 Date:   3/19/24              TX#: 3/5 Date:                 TX#: 4/ Date:                 TX#: 5/ Date:   Tx#: 6/     Manual   STM/IASTM R Achilles/gastroc Nustep 2 mins  Toes only 1 min  5 cords squats and PF 10 x 2   Toe raises on foam pad 10 x 2  1/2 foam balance in bars   SL 1 # 10 x 2 balance lateral and anterior       Talocrual mobes inv/ever GR III, IV TC mobs grade grade  3-4 mult bouts        TE  Gastroc and soleus stretches 30\" x3 each  DL heel raise/eccen control 20x  Ice massage Achilles              HEP: gastroc, soleus stretches. DLHR    Charges: man x2 25  TE x 1 20'       Total Timed Treatment: 45 min  Total Treatment Time: 45 min

## 2024-03-21 ENCOUNTER — APPOINTMENT (OUTPATIENT)
Dept: PHYSICAL THERAPY | Age: 64
End: 2024-03-21
Attending: PODIATRIST
Payer: COMMERCIAL

## 2024-03-28 ENCOUNTER — OFFICE VISIT (OUTPATIENT)
Dept: PHYSICAL THERAPY | Age: 64
End: 2024-03-28
Attending: PODIATRIST
Payer: COMMERCIAL

## 2024-03-28 PROCEDURE — 97140 MANUAL THERAPY 1/> REGIONS: CPT

## 2024-03-28 PROCEDURE — 97110 THERAPEUTIC EXERCISES: CPT

## 2024-03-28 NOTE — PROGRESS NOTES
Diagnosis:   Achilles tendinosis of right ankle (M67.873)       Referring Provider: Abilio  Date of Evaluation:    2/27/24    Precautions:  None Next MD visit:   none scheduled  Date of Surgery: n/a   [place] / N/[place]     # Auth Visits: 5 visits 2/27-5/27          Subjective:   2.5 /10 R side , yesterday was slightly higher with walking and having to take off quickly. After the last appt I did feel better. 1/10 at that point.   Attempted to take the lift in, it does feel better to have it in my shoe.   Pain: 1/10      Objective:   (+) tenderness/thickening R Achilles      Assessment:  progressed to weight bearing on the TRX for lunges and squats. Still can feel some symptoms with quick walking.  Took the lift out of his shoe today. Hard time with mari disc for balance R > L.     Better post visit- less edema post visit in the R achilles.     Goals:    (to be met in 8 visits)  Pt to report walking for 1 mile without increase in symptoms R achilles  Pt will report standing for 45 mins without increase in pain R achilles  Pt will demo SL balance 30 secs B      Plan: cont with PT for R achilles pain- assess taking the lift out of the shoe  Date: 3/7/2024  TX#: 2/5 Date:   3/19/24              TX#: 3/5 Date:   3/28/24         TX#: 4/8 Date:                 TX#: 5/ Date:   Tx#: 6/     Manual   STM/IASTM R Achilles/gastroc Nustep 2 mins  Toes only 1 min  5 cords squats and PF 10 x 2   Toe raises on foam pad 10 x 2  1/2 foam balance in bars   SL 1 # 10 x 2 balance lateral and anterior  Nustep 5 mins   3 mins flat foot  One min up on toes  TRX squat with PF 10 x 2     Back haywood lunges with TRX 10 x   1/2 foam tandem balance  10 secs x mult bouts        Talocrual mobes inv/ever GR III, IV TC mobs grade grade  3-4 mult bouts   Achilles STM and gastroc  TCJ mobs post mult bouts grade 2-3   Standing gastroc and solus stretch 2 x 30 secs B      TE  Gastroc and soleus stretches 30\" x3 each  DL heel  raise/eccen control 20x  Ice massage Achilles              HEP: gastroc, soleus stretches. DLHR    Charges: man x2 25  TE x 1 20'       Total Timed Treatment: 45 min  Total Treatment Time: 45 min

## 2024-04-01 ENCOUNTER — TELEPHONE (OUTPATIENT)
Dept: PHYSICAL THERAPY | Facility: HOSPITAL | Age: 64
End: 2024-04-01

## 2024-04-02 ENCOUNTER — OFFICE VISIT (OUTPATIENT)
Dept: PHYSICAL THERAPY | Age: 64
End: 2024-04-02
Attending: PODIATRIST
Payer: COMMERCIAL

## 2024-04-02 PROCEDURE — 97140 MANUAL THERAPY 1/> REGIONS: CPT

## 2024-04-02 PROCEDURE — 97110 THERAPEUTIC EXERCISES: CPT

## 2024-04-02 NOTE — PROGRESS NOTES
Diagnosis:   Achilles tendinosis of right ankle (M67.873)       Referring Provider: Abilio  Date of Evaluation:    2/27/24    Precautions:  None Next MD visit:   none scheduled  Date of Surgery: n/a   [place] / N[place]     # Auth Visits: 5 visits 2/27-5/27          Subjective:  yesterday , taking the train in the morning, felt it running for the train. Still have the lift in my shoe.   Pain: 1/10      Objective:   (+) tenderness/thickening R Achilles      Assessment:   increased in thickness today in the achilles. He did report feeling better up until taking off for the train after the last appt- today , sore. Has been icing. Discussed seeing Dr Brar for a PRP or to discuss this option at this time. He is still wearing the lift in her shoe at this time.     Goals:    (to be met in 8 visits)  Pt to report walking for 1 mile without increase in symptoms R achilles  Pt will report standing for 45 mins without increase in pain R achilles  Pt will demo SL balance 30 secs B      Plan: cont with PT for R achilles pain- assess taking the lift out of the shoe  Date: 3/7/2024  TX#: 2/5 Date:   3/19/24              TX#: 3/5 Date:   3/28/24         TX#: 4/8 Date:      4/2/24          TX#: 5/8 Date:   Tx#: 6/     Manual   STM/IASTM R Achilles/gastroc Nustep 2 mins  Toes only 1 min  5 cords squats and PF 10 x 2   Toe raises on foam pad 10 x 2  1/2 foam balance in bars   SL 1 # 10 x 2 balance lateral and anterior  Nustep 5 mins   3 mins flat foot  One min up on toes  TRX squat with PF 10 x 2     Back haywood lunges with TRX 10 x   1/2 foam tandem balance  10 secs x mult bouts    Nustep 3 mins feet flat  Rocker board 10 x 2 stretch 2 x 20 secs     Taps off the foam B 10 x each side    Cupping gastroc 5 mins   Achilles R tendon 10  mins  STM   PROM R ankle all planes with post mobs TCJ grade 2-3 mult bouts   MT total 23 mins   Gastroc and soleus stretch 2 x 30 secs            Talocrual mobes inv/ever GR  III, IV TC mobs grade grade  3-4 mult bouts   Achilles STM and gastroc  TCJ mobs post mult bouts grade 2-3   Standing gastroc and solus stretch 2 x 30 secs B      TE  Gastroc and soleus stretches 30\" x3 each  DL heel raise/eccen control 20x  Ice massage Achilles              HEP: gastroc, soleus stretches. DLHR    Charges: man x2 23  TE x 1 15'       Total Timed Treatment: 45 min  Total Treatment Time: 45 min

## 2024-04-03 ENCOUNTER — TELEPHONE (OUTPATIENT)
Dept: ORTHOPEDICS CLINIC | Facility: CLINIC | Age: 64
End: 2024-04-03

## 2024-04-03 DIAGNOSIS — M79.671 PAIN OF RIGHT HEEL: Primary | ICD-10-CM

## 2024-04-03 NOTE — TELEPHONE ENCOUNTER
Patient is coming in for right achilles tightness and pain. Patient was seen by Dr. Knox and has been doing Physical Therapy.    His Physical Therapist is referring him to Dr. Brar for further evaluation and pain. No recent imaging in for this pain.    Please advise if anything is needed.    Thank you!    Future Appointments   Date Time Provider Department Center   4/4/2024 10:45 AM [facility]   4/8/2024  8:40 AM [id number]   4/8/2024  5:00 PM Mike Phillips LCSW [facility]   5/10/2024  8:45 AM Pascual Palacios MD [facility]   5/17/2024 10:30 AM Monik Lopez APN [facility]

## 2024-04-04 ENCOUNTER — OFFICE VISIT (OUTPATIENT)
Dept: PHYSICAL THERAPY | Age: 64
End: 2024-04-04
Attending: PODIATRIST
Payer: COMMERCIAL

## 2024-04-04 ENCOUNTER — TELEPHONE (OUTPATIENT)
Dept: ORTHOPEDICS CLINIC | Facility: CLINIC | Age: 64
End: 2024-04-04

## 2024-04-04 DIAGNOSIS — M25.571 RIGHT ANKLE PAIN, UNSPECIFIED CHRONICITY: Primary | ICD-10-CM

## 2024-04-04 PROCEDURE — 97110 THERAPEUTIC EXERCISES: CPT

## 2024-04-04 PROCEDURE — 97140 MANUAL THERAPY 1/> REGIONS: CPT

## 2024-04-04 NOTE — PROGRESS NOTES
Diagnosis:   Achilles tendinosis of right ankle (M67.873)       Referring Provider: Abilio  Date of Evaluation:    2/27/24    Precautions:  None Next MD visit:   none scheduled  Date of Surgery: n/a   [place] / N/[place]     # Auth Visits: 5 visits 2/27-5/27          Subjective  achilles 0.1 . Only feel it when I have to marcin the train. Appt with Dr Brar on Monday to discuss possible PRP injection -  Pain:  gastroc today less cramping or pain today.       Objective:   (+) tenderness/thickening R Achilles      Assessment:   pt was feeling better today versus Tuesday. Still some inflammation and thickening in the R achilles. Improved post visit. Pt is seeing MD on Monday to determine if he is appropriate for a PRP injection in the area.      Goals:    (to be met in 8 visits)  Pt to report walking for 1 mile without increase in symptoms R achilles  Pt will report standing for 45 mins without increase in pain R achilles  Pt will demo SL balance 30 secs B      Plan: cont with PT for R achilles pain- assess taking the lift out of the shoe  Date: 3/7/2024  TX#: 2/5 Date:   3/19/24              TX#: 3/5 Date:   3/28/24         TX#: 4/8 Date:      4/2/24          TX#: 5/8 Date: 4/4/24  Tx#: 6/8     Manual   STM/IASTM R Achilles/gastroc Nustep 2 mins  Toes only 1 min  5 cords squats and PF 10 x 2   Toe raises on foam pad 10 x 2  1/2 foam balance in bars   SL 1 # 10 x 2 balance lateral and anterior  Nustep 5 mins   3 mins flat foot  One min up on toes  TRX squat with PF 10 x 2     Back haywood lunges with TRX 10 x   1/2 foam tandem balance  10 secs x mult bouts    Nustep 3 mins feet flat  Rocker board 10 x 2 stretch 2 x 20 secs     Taps off the foam B 10 x each side    Cupping gastroc 5 mins   Achilles R tendon 10  mins  STM   PROM R ankle all planes with post mobs TCJ grade 2-3 mult bouts   MT total 23 mins   Gastroc and soleus stretch 2 x 30 secs         Bosu step ups and through 10 x each   Squat  10 x on bosu  Single leg squat on color tabs B 10 x each side    2 sets  STM R achilles and gastrocs/ tool 15 mins    DF/PF GTB 10 x each in hook lying  Standing gastroc and soleus stretch 3 x 30 secs        Talocrual mobes inv/ever GR III, IV TC mobs grade grade  3-4 mult bouts   Achilles STM and gastroc  TCJ mobs post mult bouts grade 2-3   Standing gastroc and solus stretch 2 x 30 secs B      TE  Gastroc and soleus stretches 30\" x3 each  DL heel raise/eccen control 20x  Ice massage Achilles              HEP: gastroc, soleus stretches. DLHR    Charges: man x2 15  TE x 2 25'       Total Timed Treatment: 45 min  Total Treatment Time: 45 min

## 2024-04-06 ENCOUNTER — HOSPITAL ENCOUNTER (OUTPATIENT)
Dept: CT IMAGING | Facility: HOSPITAL | Age: 64
Discharge: HOME | End: 2024-04-06
Attending: UROLOGY
Payer: COMMERCIAL

## 2024-04-06 ENCOUNTER — HOSPITAL ENCOUNTER (OUTPATIENT)
Dept: GENERAL RADIOLOGY | Facility: HOSPITAL | Age: 64
Discharge: HOME | End: 2024-04-06
Attending: ORTHOPAEDIC SURGERY
Payer: COMMERCIAL

## 2024-04-06 DIAGNOSIS — N20.0 KIDNEY STONE: ICD-10-CM

## 2024-04-06 DIAGNOSIS — M79.671 PAIN OF RIGHT HEEL: ICD-10-CM

## 2024-04-06 PROCEDURE — 74176 CT ABD & PELVIS W/O CONTRAST: CPT | Performed by: UROLOGY

## 2024-04-06 PROCEDURE — 73650 X-RAY EXAM OF HEEL: CPT | Performed by: ORTHOPAEDIC SURGERY

## 2024-04-08 ENCOUNTER — HOSPITAL ENCOUNTER (OUTPATIENT)
Dept: ULTRASOUND IMAGING | Age: 64
Discharge: HOME | End: 2024-04-08
Attending: FAMILY MEDICINE
Payer: COMMERCIAL

## 2024-04-08 ENCOUNTER — OFFICE VISIT (OUTPATIENT)
Dept: ORTHOPEDICS CLINIC | Facility: CLINIC | Age: 64
End: 2024-04-08
Payer: COMMERCIAL

## 2024-04-08 ENCOUNTER — HOSPITAL ENCOUNTER (OUTPATIENT)
Dept: GENERAL RADIOLOGY | Age: 64
Discharge: HOME | End: 2024-04-08
Attending: ORTHOPAEDIC SURGERY
Payer: COMMERCIAL

## 2024-04-08 VITALS — WEIGHT: 100 LBS | BODY MASS INDEX: 14.81 KG/M2 | HEIGHT: 69 IN

## 2024-04-08 DIAGNOSIS — I65.29 STENOSIS OF CAROTID ARTERY, UNSPECIFIED LATERALITY: ICD-10-CM

## 2024-04-08 DIAGNOSIS — M25.571 RIGHT ANKLE PAIN, UNSPECIFIED CHRONICITY: ICD-10-CM

## 2024-04-08 DIAGNOSIS — M67.873 ACHILLES TENDINOSIS OF RIGHT ANKLE: Primary | ICD-10-CM

## 2024-04-08 PROCEDURE — 3008F BODY MASS INDEX DOCD: CPT | Performed by: ORTHOPAEDIC SURGERY

## 2024-04-08 PROCEDURE — 99204 OFFICE O/P NEW MOD 45 MIN: CPT | Performed by: ORTHOPAEDIC SURGERY

## 2024-04-08 PROCEDURE — 93880 EXTRACRANIAL BILAT STUDY: CPT | Performed by: FAMILY MEDICINE

## 2024-04-08 NOTE — H&P
Orthopedic Surgery  [address] [place] [address]  [phone]     NEW PATIENT VISIT - HISTORY AND PHYSICAL EXAMINATION     Name: Werner Parks   MRN: [id number]  Date: 4/8/2024     CC: Right achilles Pain    REFERRED BY: Elif Aguilera MD    HPI:   Werner Parks is a very pleasant 64 year old male who presents today for evaluation of right achilles pain ongoing for 1.5 years likely due to running and playing soccer. Pain is 3/10 with swelling and burning. He has to modify daily activities. He was seen by Dr. Knox and was diagnosed with achilles tendinitis. Physical therapy once a week has provided limited relief.     He works as an .    PMH:   Past Medical History:   Diagnosis Date    Back problem     Calculus of kidney     Depression     Extrinsic asthma, unspecified     Hearing loss     High cholesterol     History of COVID-19 05/2023    Had cough, \"flu like\"  S/S x 1 week. No hospitalization.    Other and unspecified hyperlipidemia     Visual impairment     glasses    Wears glasses        PAST SURGICAL HX:  Past Surgical History:   Procedure Laterality Date    COLONOSCOPY  7-31-12 [place] [facility]    COLONOSCOPY      ORAL SURGERY PROCEDURE      [place] [facility]    OTHER SURGICAL HISTORY      varicose vein to left testicle    OTHER SURGICAL HISTORY      sinus sx    OTHER SURGICAL HISTORY  03/21/2019    Cysto/ Stent Removal [facility]    REMOVAL OF KIDNEY STONE Left 02/02/2024    TONSILLECTOMY         FAMILY HX:  Family History   Problem Relation Age of Onset    Heart Attack Father     Mental Disorder Mother        ALLERGIES:  Sulfamethoxazole w/trimethoprim    MEDICATIONS:   Current Outpatient Medications   Medication Sig Dispense Refill    [START ON 4/17/2024] DULoxetine 20 MG Oral Cap DR Particles Take 1 capsule (20 mg total) by mouth 2 (two) times daily. 60 capsule 0    Polyethylene Glycol 3350 (MIRALAX) 17 g Oral Powd Pack Take 17 g by mouth daily as  needed (Take to avoid constipation, especially if taking narcotic pain medications.). 20 packet 1    neomycin-polymyxin-dexamethasone 3.5-39216-3.1 Ophthalmic Ointment Place 1 Application  into both eyes 3 (three) times daily. (Patient taking differently: Place 1 Application  into both eyes as needed.) 3.5 g 3    doxycycline 100 MG Oral Cap Take 1 capsule (100 mg total) by mouth daily. 90 capsule 1    Potassium Citrate ER (UROCIT-K 15) 15 MEQ (1620 MG) Oral Tab CR Take 1 tablet by mouth in the morning and 1 tablet before bedtime. 90 tablet 5    Cyanocobalamin (VITAMIN B 12) 500 MCG Oral Tab Take 1 tablet by mouth daily.      Coenzyme Q10 100 MG Oral Cap 1 capsule with a meal Orally Once a day      levothyroxine 50 MCG Oral Tab 1 tablet in the morning on an empty stomach Orally Once a day for 90 days      Multiple Vitamin (MULTI VITAMIN) Oral Tab Take 1 tablet by mouth daily.      tadalafil 5 MG Oral Tab take 1 tablet by mouth daily as needed 1 hour prior to intercourse as directed Orally as directed for 90 days      Meloxicam 15 MG Oral Tab Take 1 tablet (15 mg total) by mouth daily. (Patient taking differently: Take 1 tablet (15 mg total) by mouth as needed.) 30 tablet 0    simvastatin 20 MG Oral Tab Take 1 tablet (20 mg total) by mouth every evening.      loratadine 10 MG Oral Tab Take 1 tablet (10 mg total) by mouth daily.         ROS: A comprehensive 14 point review of systems was performed and was negative aside from the aforementioned per history of present illness.    SOCIAL HX:  Social History     Tobacco Use    Smoking status: Former     Types: Cigarettes     Passive exposure: Past (1980s-1990s from ex wife)    Smokeless tobacco: Former    Tobacco comments:     Social 5 cigarettes/week   Substance Use Topics    Alcohol use: Yes     Comment: occasionally         PE:   Vitals:    04/08/24 0856   Weight: 100 lb   Height: 5' 9\" (1.753 m)     Estimated body mass index is 14.77 kg/m² as calculated from the  following:    Height as of this encounter: 5' 9\" (1.753 m).    Weight as of this encounter: 100 lb.    Physical Exam  Constitutional:       Appearance: Normal appearance.   HENT:      Head: Normocephalic and atraumatic.   Eyes:      Extraocular Movements: Extraocular movements intact.   Neck:      Musculoskeletal: Normal range of motion and neck supple.   Cardiovascular:      Pulses: Normal pulses.   Pulmonary:      Effort: Pulmonary effort is normal. No respiratory distress.   Abdominal:      General: There is no distension.   Skin:     General: Skin is warm.      Capillary Refill: Capillary refill takes less than 2 seconds.      Findings: No bruising.   Neurological:      General: No focal deficit present.      Mental Status: Alert.   Psychiatric:         Mood and Affect: Mood normal.     Examination of the right achilles demonstrates:     Skin is intact, warm and dry.  There is a focal area of swelling the Achilles tendon approximately 3 to 4 cm proximal to the calcaneal tuberosity attachment.    Inspection:   Atrophy: none    Effusion: none    Edema: moderate    Erythema: none    Hematoma: none      Palpation:   Anterior Talo-Fibular Ligament (ATFL): none    Fibular Ligament (PTFL): none    Calcaneo-Fibular Ligament (CFL): none    Achilles Tendon: significant    Peroneal Tendon: none    Posterior Tib Tendon: none    Talar dome: none    Metatarsal bones: none    Joint line tenderness: none  Crepitation: none     ROM:   Dorsiflexion: 10 degrees.  Plantarflexion: 40 degrees.  Eversion:  20 degrees  Inversion: 30 degrees.    Strength: mild weakness     Special Tests:   Anterior Drawer Test ATFL Rupture: Negative  CFL Forced Inversion: Negative   Talar Dome:  Negative   Gait:  normal   Leg length: equal and symmetric  Alignment:  neutral     No obvious peripheral edema noted.   Distal neurovascular exam demonstrates normal perfusion, intact sensation to light touch and full strength.     Examination of the  contralateral foot/ankle demonstrates:  No significant atrophy, swelling or effusion. Full range of motion. Neurovascularly intact distally.    Radiographic Examination/Diagnostics:  Heel XR personally viewed, independently interpreted and radiology report was reviewed.    XR HEEL (CALCANEUS) (MIN 2 VIEWS), RIGHT (CPT=73650)    Result Date: 4/6/2024  CONCLUSION:  Calcaneal plantar spurring.  No acute fracture or dislocation right calcaneus.   LOCATION:  [facility]   Dictated by (CST): Monie Taylor MD on 4/06/2024 at 2:42 PM     Finalized by (CST): Monie Taylor MD on 4/06/2024 at 2:42 PM        IMPRESSION: Werner Parks is a 64 year old male with right ankle Achilles tendinosis ongoing for 1.5 years likely due to running and playing soccer. Physical therapy once a week has provided minimal relief.    We elected to maximize conservative management with PRP injection coupled with physical therapy. An MRI was ordered for further evaluation.    PLAN:   We had a detailed discussion outlining the etiology, anatomy, pathophysiology, and natural history of patient's findings. Imaging was reviewed in detail.    We provided education, and discussed at great length the use of OrthoBiologics, specifically, Platelet Rich Plasma (PRP). We discussed the growing evidence for the efficacy of PRP injections with regard to the patient's specific findings, as well as the promotion of healing for muscle, tendon, and joint injuries.     We discussed the scientific rationale for this procedure which is that the plasma contains platelets which release growth factors that induce a healing response wherever they are applied. We also discussed the benefits, risks, and limitations. The patient understands that there is a slightly higher level of complexity to this procedure compared to other injections such as cortisone, or viscosupplementation.     We also discussed the benefits of PRP in comparison to surgery, specifically  including, but not limited to: less invasive than an open surgical procedure for the same condition, possible shorter recovery time, significantly more cost effective.      I elected to order an MRI scan of the achilles to further characterize internal derangement, I will plan to follow-up with the patient after completion of the MRI scan.     FOLLOW-UP:  Return to clinic after MRI completion.  Patient will plan for scheduling PRP injection.        Jacqui Brar MD  Knee, Shoulder, & Elbow Surgery / Sports Medicine Specialist  Orthopaedic Surgery  [address] [place] [address].org  [email or web address]  t: [phone]  o: [phone]  f: [phone]    This note was dictated using Dragon software.  While it was briefly proofread prior to completion, some grammatical, spelling, and word choice errors due to dictation may still occur.

## 2024-04-18 ENCOUNTER — OFFICE VISIT (OUTPATIENT)
Dept: PHYSICAL THERAPY | Age: 64
End: 2024-04-18
Attending: PODIATRIST
Payer: COMMERCIAL

## 2024-04-18 PROCEDURE — 97110 THERAPEUTIC EXERCISES: CPT

## 2024-04-18 PROCEDURE — 97140 MANUAL THERAPY 1/> REGIONS: CPT

## 2024-04-18 NOTE — PROGRESS NOTES
Diagnosis:   Achilles tendinosis of right ankle (M67.873)       Referring Provider: Abilio  Date of Evaluation:    2/27/24    Precautions:  None Next MD visit:   none scheduled  Date of Surgery: n/a   Insurance Primary/Secondary[place] / N/A     # Auth Visits: 5 visits 2/27-5/27          Subjective   : got a call for an MRI.   Prior to the PRP. May 30th for the MRI.   Pain:  still feeling some soreness in the R achilles. Edema does not improve unless I ice the area. Still have the lift at times in the shoe. When I have been walking I feel like my leg ER outward when I feel tingling into the plantar foot.  Was happening in the past, but is more consistent.       Objective:   (+) tenderness/thickening R Achilles      Assessment:  pt very tender on the R achilles today- did improved with manual work and tooling. Not sure about his symptom that comes on with walking that goes up to his buttock region ( nerve) explained most symptoms refer down. Did issue nerve glides and to cont with HS stretches on that side. Has hx of sciatica on the R. Will cont to monitor the L . He is to watch for any patterns or when it comes on . Lasts for about 5 mins per pt.         Goals:    (to be met in 8 visits)  Pt to report walking for 1 mile without increase in symptoms R achilles  Pt will report standing for 45 mins without increase in pain R achilles  Pt will demo SL balance 30 secs B      Plan: cont with PT for R achilles pain- assess taking the lift out of the shoe  Date: 3/7/2024  TX#: 2/5 Date:   3/19/24              TX#: 3/5 Date:   3/28/24         TX#: 4/8 Date:      4/2/24          TX#: 5/8 Date: 4/4/24  Tx#: 6/8 7/8 4/18/24     Manual   STM/IASTM R Achilles/gastroc Nustep 2 mins  Toes only 1 min  5 cords squats and PF 10 x 2   Toe raises on foam pad 10 x 2  1/2 foam balance in bars   SL 1 # 10 x 2 balance lateral and anterior  Nustep 5 mins   3 mins flat foot  One min up on toes  TRX squat with PF 10 x 2     Back haywood  lunges with TRX 10 x   1/2 foam tandem balance  10 secs x mult bouts    Nustep 3 mins feet flat  Rocker board 10 x 2 stretch 2 x 20 secs     Taps off the foam B 10 x each side    Cupping gastroc 5 mins   Achilles R tendon 10  mins  STM   PROM R ankle all planes with post mobs TCJ grade 2-3 mult bouts   MT total 23 mins   Gastroc and soleus stretch 2 x 30 secs         Bosu step ups and through 10 x each   Squat 10 x on bosu  Single leg squat on color tabs B 10 x each side    2 sets  STM R achilles and gastrocs/ tool 15 mins    DF/PF GTB 10 x each in hook lying  Standing gastroc and soleus stretch 3 x 30 secs      Nustep 2 mins feet flat and one up on toes with   Mobs R hip inf anf lateral glides 10 x each   HS glides 20 secs glossing of the nerve    90/90   Sharman 10 x each   2 sets  R heel STM / tool 10 mins   PROM R ankle all planes 5 mins             Talocrual mobes inv/ever GR III, IV TC mobs grade grade  3-4 mult bouts   Achilles STM and gastroc  TCJ mobs post mult bouts grade 2-3   Standing gastroc and solus stretch 2 x 30 secs B       TE  Gastroc and soleus stretches 30\" x3 each  DL heel raise/eccen control 20x  Ice massage Achilles                HEP: gastroc, soleus stretches. DLHR    Charges: MT 1 EX 2      Total Timed Treatment: 45 min  Total Treatment Time: 45 min

## 2024-05-01 ENCOUNTER — OFFICE VISIT (OUTPATIENT)
Dept: PHYSICAL THERAPY | Age: 64
End: 2024-05-01
Attending: PODIATRIST
Payer: COMMERCIAL

## 2024-05-01 PROCEDURE — 97110 THERAPEUTIC EXERCISES: CPT

## 2024-05-01 PROCEDURE — 97140 MANUAL THERAPY 1/> REGIONS: CPT

## 2024-05-01 NOTE — PROGRESS NOTES
Diagnosis:   Achilles tendinosis of right ankle (M67.873)       Referring Provider: Abilio  Date of Evaluation:    2/27/24    Precautions:  None Next MD visit:   none scheduled  Date of Surgery: n/a   Insurance Primary/Secondary: [employer] / N/A     # Auth Visits: 5 visits 2/27-5/27          Subjective   :  having an MRI prior to the PRP injection. Low back is really sore, sitting slightly forward I have spasms, pain in the low back hard time getting up.   Pain:   with taking off to make a train can increase 3-5 /10 in the R achilles.     Objective:   (+) tenderness/thickening R Achilles      Assessment:    pt feels better post visit, carry over is not more then a few days. Pt to cont with his core and hip / ankle strengthening. Will re-assess post PRP injection     Goals:    (to be met in 8 visits)  Pt to report walking for 1 mile without increase in symptoms R achilles  Pt will report standing for 45 mins without increase in pain R achilles  Pt will demo SL balance 30 secs B      Plan: cont with PT for R achilles pain-hold until pt has MRI and PRP injection   Date: 3/7/2024  TX#: 2/5 Date:   3/19/24              TX#: 3/5 Date:   3/28/24         TX#: 4/8 Date:      4/2/24          TX#: 5/8 Date: 4/4/24  Tx#: 6/8 7/8 4/18/24 8/8 5/1/24     Manual   STM/IASTM R Achilles/gastroc Nustep 2 mins  Toes only 1 min  5 cords squats and PF 10 x 2   Toe raises on foam pad 10 x 2  1/2 foam balance in bars   SL 1 # 10 x 2 balance lateral and anterior  Nustep 5 mins   3 mins flat foot  One min up on toes  TRX squat with PF 10 x 2     Back haywood lunges with TRX 10 x   1/2 foam tandem balance  10 secs x mult bouts    Nustep 3 mins feet flat  Rocker board 10 x 2 stretch 2 x 20 secs     Taps off the foam B 10 x each side    Cupping gastroc 5 mins   Achilles R tendon 10  mins  STM   PROM R ankle all planes with post mobs TCJ grade 2-3 mult bouts   MT total 23 mins   Gastroc and soleus stretch 2 x 30 secs         Bosu step ups and  through 10 x each   Squat 10 x on bosu  Single leg squat on color tabs B 10 x each side    2 sets  STM R achilles and gastrocs/ tool 15 mins    DF/PF GTB 10 x each in hook lying  Standing gastroc and soleus stretch 3 x 30 secs      Nustep 2 mins feet flat and one up on toes with   Mobs R hip inf anf lateral glides 10 x each   HS glides 20 secs glossing of the nerve    90/90   Sharman 10 x each   2 sets  R heel STM / tool 10 mins   PROM R ankle all planes 5 mins           Nustep 2 mins flat and one min up on toes  TRX squats and curtsey 10 x each     Mobs B hips inf and lateral glides 10 x each   PPU 5 x 3 secs   90/90 10 x     STM R achiilles 10 mins   PROIM R ankle all planes 5 mins    Talocrual mobes inv/ever GR III, IV TC mobs grade grade  3-4 mult bouts   Achilles STM and gastroc  TCJ mobs post mult bouts grade 2-3   Standing gastroc and solus stretch 2 x 30 secs B        TE  Gastroc and soleus stretches 30\" x3 each  DL heel raise/eccen control 20x  Ice massage Achilles                  HEP: gastroc, soleus stretches. DLHR    Charges: MT 1 EX 2      Total Timed Treatment: 45 min  Total Treatment Time: 45 min

## 2024-05-30 ENCOUNTER — HOSPITAL ENCOUNTER (OUTPATIENT)
Dept: MRI IMAGING | Facility: HOSPITAL | Age: 64
Discharge: HOME | End: 2024-05-30
Attending: ORTHOPAEDIC SURGERY
Payer: COMMERCIAL

## 2024-05-30 DIAGNOSIS — M67.873 ACHILLES TENDINOSIS OF RIGHT ANKLE: ICD-10-CM

## 2024-05-30 PROCEDURE — 73721 MRI JNT OF LWR EXTRE W/O DYE: CPT | Performed by: ORTHOPAEDIC SURGERY

## 2024-09-23 ENCOUNTER — OFFICE VISIT (OUTPATIENT)
Facility: CLINIC | Age: 64
End: 2024-09-23
Payer: COMMERCIAL

## 2024-09-23 VITALS
SYSTOLIC BLOOD PRESSURE: 128 MMHG | RESPIRATION RATE: 16 BRPM | HEART RATE: 88 BPM | DIASTOLIC BLOOD PRESSURE: 70 MMHG | WEIGHT: 160 LBS | BODY MASS INDEX: 24 KG/M2

## 2024-09-23 DIAGNOSIS — R51.9 NONINTRACTABLE HEADACHE, UNSPECIFIED CHRONICITY PATTERN, UNSPECIFIED HEADACHE TYPE: ICD-10-CM

## 2024-09-23 DIAGNOSIS — H93.13 TINNITUS OF BOTH EARS: Primary | ICD-10-CM

## 2024-09-23 PROCEDURE — 3074F SYST BP LT 130 MM HG: CPT | Performed by: OTHER

## 2024-09-23 PROCEDURE — 99204 OFFICE O/P NEW MOD 45 MIN: CPT | Performed by: OTHER

## 2024-09-23 PROCEDURE — 3078F DIAST BP <80 MM HG: CPT | Performed by: OTHER

## 2024-09-23 NOTE — PROGRESS NOTES
Neurology History & Physical     ASSESSMENT & PLAN:      ICD-10-CM    1. Tinnitus of both ears  H93.13 CTA BRAIN (CPT=70496)      2. Nonintractable headache, unspecified chronicity pattern, unspecified headache type  R51.9 CTA BRAIN (CPT=70496)        Overall no concern for structural lesion in brain, but worsening tinnitus + headache + FH of cerebral aneurysm warrant CTA head for vascular lesion in the head.    RLE numbness, but normal exam, no back or leg pain currently.  CTM.    Return in about 3 months (around 12/23/2024).       ~~~~~~~~~~~~~~~~~~~~~~~~~~~    CHIEF COMPLAINT / REASON FOR VISIT:    Chief Complaint   Patient presents with    Neurologic Problem     Patient states tinnitus in both ears over 10 years worth tinnitus. Patient states increase in tinnitus within the last 5 years. Slight balance issues.      HISTORY OBTAINED FROM:  Patient and others as above  Chart review    HISTORY:  Werner Parks is a 64 year old male with 10+ years of bilateral tinnitus, gradually worsening.  Also has mild SNHL, has seen ENT for this.  Does have intermittent imbalance, very rarely and not out of the ordinary, if he stands up too fast.  He plays soccer without issue.    Sister had a stroke due to cerebral aneurysm rupture, and brother had an asymptomatic aneurysm as well.  So he is concerned about having one.  Does have right sided headaches, mild and infrequent, brief and non requiring medications.  Had some blurry vision which was corrected by new glasses Rx.    He also has RLE numbness and tingling (lateral thigh and sole at times).  No back pain currently, though in past he had back pain and ? sciatica with loss of left knee reflex which presumably recovered.    DATA REVIEWED:  As documented in the history    July 2024  PCP note    April 2024  CUS unremarkable     Jan 2024  BMP unremarkable     Dec 2023  ENT note     PHYSICAL EXAMINATION:  /70   Pulse 88   Resp 16   Wt 160 lb (72.6 kg)   BMI 23.63  kg/m²     Gen: in NAD  MSE: AAOx3, nl language, nl attn/conc, nl fund of knowledge  CN: PERRL, VFF; EOMI, no nystagmus; nl facial mvmt bilaterally; nl hearing bilaterally; nl palate elevation bilaterally; nl voice; nl shoulder shrug b/I; nl tongue movement  Motor: 5/5 x4; no drift; normal tone; no abnormal movements  Sensory: nl vibration x4  Coord: nl FTN b/I  Reflex: 2+ BUE and BLE  Gait: normal    Allergies   Allergen Reactions    Sulfamethoxazole W/Trimethoprim TONGUE SWELLING       Current medications:   DULOXETINE 20 MG Oral Cap DR Particles TAKE 2 CAPSULES BY MOUTH AT BEDTIME 180 capsule 0    doxycycline 100 MG Oral Cap Take 1 capsule (100 mg total) by mouth daily. 90 capsule 1    neomycin-polymyxin-dexamethasone 3.5-71177-4.1 Ophthalmic Ointment Place 1 Application  into both eyes as needed. 3.5 g 5    Potassium Citrate ER (UROCIT-K 15) 15 MEQ (1620 MG) Oral Tab CR Take 1 tablet by mouth in the morning and 1 tablet before bedtime. 90 tablet 5    Cyanocobalamin (VITAMIN B 12) 500 MCG Oral Tab Take 1 tablet by mouth daily.      Coenzyme Q10 100 MG Oral Cap 1 capsule with a meal Orally Once a day      levothyroxine 50 MCG Oral Tab 1 tablet in the morning on an empty stomach Orally Once a day for 90 days      Multiple Vitamin (MULTI VITAMIN) Oral Tab Take 1 tablet by mouth daily.      tadalafil 5 MG Oral Tab take 1 tablet by mouth daily as needed 1 hour prior to intercourse as directed Orally as directed for 90 days      Meloxicam 15 MG Oral Tab Take 1 tablet (15 mg total) by mouth daily. (Patient taking differently: Take 1 tablet (15 mg total) by mouth as needed.) 30 tablet 0    simvastatin 20 MG Oral Tab Take 1 tablet (20 mg total) by mouth every evening.      loratadine 10 MG Oral Tab Take 1 tablet (10 mg total) by mouth daily.         Past Medical History:    Back problem    Calculus of kidney    Depression    Extrinsic asthma, unspecified    Hearing loss    High cholesterol    History of COVID-19    Had  cough, \"flu like\"  S/S x 1 week. No hospitalization.    Other and unspecified hyperlipidemia    Visual impairment    glasses    Wears glasses       Past Surgical History:   Procedure Laterality Date    Colonoscopy  7-31-12 [facility]    Colonoscopy      Oral surgery procedure      [place] [facility]    Other surgical history      varicose vein to left testicle    Other surgical history      sinus sx    Other surgical history  03/21/2019    Cysto/ Stent Removal [facility]    Removal of kidney stone Left 02/02/2024    Tonsillectomy         Social History     Socioeconomic History    Marital status:    Tobacco Use    Smoking status: Former     Types: Cigarettes     Passive exposure: Past (1980s-1990s from ex wife)    Smokeless tobacco: Former    Tobacco comments:     Social 5 cigarettes/week   Vaping Use    Vaping status: Never Used   Substance and Sexual Activity    Alcohol use: Yes     Comment: occasionally    Drug use: No   Other Topics Concern    Caffeine Concern Yes     Comment: coffee 2 cups daily    Exercise No     Comment: on hold       Family History   Problem Relation Age of Onset    Heart Attack Father     Mental Disorder Mother        Savanna Joseph MD, FAES, FAAN  Board-Certified in Neurology, Epilepsy, and Clinical Neurophysiology  [facility]

## 2024-09-23 NOTE — PATIENT INSTRUCTIONS
Refill policies:    Allow 2-3 business days for refills; controlled substances may take longer.  Contact your pharmacy at least 5 days prior to running out of medication and have them send an electronic request or submit request through the “request refill” option in your [facility] account.  Refills are not addressed on weekends; covering physicians do not authorize routine medications on weekends.  No narcotics or controlled substances are refilled after noon on Fridays or by on call physicians.  By law, narcotics must be electronically prescribed.  A 30 day supply with no refills is the maximum allowed.  If your prescription is due for a refill, you may be due for a follow up appointment.  To best provide you care, patients receiving routine medications need to be seen at least once a year.  Patients receiving narcotic/controlled substance medications need to be seen at least once every 3 months.  In the event that your preferred pharmacy does not have the requested medication in stock (e.g. Backordered), it is your responsibility to find another pharmacy that has the requested medication available.  We will gladly send a new prescription to that pharmacy at your request.    Scheduling Tests:    If your physician has ordered radiology tests such as MRI or CT scans, please contact Central Scheduling at [phone] right away to schedule the test.  Once scheduled, the [facility] Centralized Referral Team will work with your insurance carrier to obtain pre-certification or prior authorization.  Depending on your insurance carrier, approval may take 3-10 days.  It is highly recommended patients assure they have received an authorization before having a test performed.  If test is done without insurance authorization, patient may be responsible for the entire amount billed.      Precertification and Prior Authorizations:  If your physician has recommended that you have a procedure or additional testing performed the [facility]  Centralized Referral Team will contact your insurance carrier to obtain pre-certification or prior authorization.    You are strongly encouraged to contact your insurance carrier to verify that your procedure/test has been approved and is a COVERED benefit.  Although the [facility] Centralized Referral Team does its due diligence, the insurance carrier gives the disclaimer that \"Although the procedure is authorized, this does not guarantee payment.\"    Ultimately the patient is responsible for payment.   Thank you for your understanding in this matter.  Paperwork Completion:  If you require FMLA or disability paperwork for your recovery, please make sure to either drop it off or have it faxed to our office at [phone]. Be sure the form has your name and date of birth on it.  The form will be faxed to our Forms Department and they will complete it for you.  There is a 25$ fee for all forms that need to be filled out.  Please be aware there is a 10-14 day turnaround time.  You will need to sign a release of information (OMAIRA) form if your paperwork does not come with one.  You may call the Forms Department with any questions at [phone].  Their fax number is [phone].

## 2024-10-11 ENCOUNTER — HOSPITAL ENCOUNTER (OUTPATIENT)
Dept: CT IMAGING | Facility: HOSPITAL | Age: 64
Discharge: HOME | End: 2024-10-11
Attending: Other
Payer: COMMERCIAL

## 2024-10-11 DIAGNOSIS — R51.9 NONINTRACTABLE HEADACHE, UNSPECIFIED CHRONICITY PATTERN, UNSPECIFIED HEADACHE TYPE: ICD-10-CM

## 2024-10-11 DIAGNOSIS — H93.13 TINNITUS OF BOTH EARS: ICD-10-CM

## 2024-10-11 LAB
CREAT BLD-MCNC: 1 MG/DL
EGFRCR SERPLBLD CKD-EPI 2021: 84 ML/MIN/1.73M2 (ref 60–?)

## 2024-10-11 PROCEDURE — 82565 ASSAY OF CREATININE: CPT

## 2024-10-11 PROCEDURE — 70496 CT ANGIOGRAPHY HEAD: CPT | Performed by: OTHER

## 2024-10-28 ENCOUNTER — OFFICE VISIT (OUTPATIENT)
Dept: ORTHOPEDICS CLINIC | Facility: CLINIC | Age: 64
End: 2024-10-28
Payer: COMMERCIAL

## 2024-10-28 DIAGNOSIS — M67.873 ACHILLES TENDINOSIS OF RIGHT ANKLE: Primary | ICD-10-CM

## 2024-10-28 PROCEDURE — 0232T NJX PLATELET PLASMA: CPT | Performed by: ORTHOPAEDIC SURGERY

## 2024-10-28 NOTE — PROCEDURES
Right Achilles Tendon Injection - Platelet Rich Plasma    Name: Werner Parks   MRN: [id number]  Date: 10/28/2024     Clinical Indications:   Achilles tendinopathy    After informed consent, the injection site was marked, sterilized with topical chlorhexidine antiseptic, and locally anesthetized with skin refrigerant.    The patient was situation in a comfortable position. Using sterile technique: 10 ml of PRP was injected utilizing a direct posterior approach with 25 gauge needle.  A band-aid was applied.  The patient tolerated the procedure well.        Jacqui Brar MD  Knee, Shoulder, & Elbow Surgery / Sports Medicine Specialist  Orthopaedic Surgery  [address] [place] [address] [id number] [place] [email or web address]  [email or web address]  t: [phone]  o: [phone]  f: [phone]

## 2024-11-01 ENCOUNTER — TELEPHONE (OUTPATIENT)
Dept: ORTHOPEDICS CLINIC | Facility: CLINIC | Age: 64
End: 2024-11-01

## 2024-11-01 DIAGNOSIS — M67.873 ACHILLES TENDINOSIS OF RIGHT ANKLE: Primary | ICD-10-CM

## 2024-11-01 NOTE — TELEPHONE ENCOUNTER
Patient needs a Physical Therapy order faxed to [phone] for his right achilles.  Please let patient know when this is done so he can call and get scheduled.

## 2024-11-06 ENCOUNTER — TELEPHONE (OUTPATIENT)
Dept: ORTHOPEDICS CLINIC | Facility: CLINIC | Age: 64
End: 2024-11-06

## 2024-11-06 NOTE — TELEPHONE ENCOUNTER
Patient states he called the [facility] PT office on [address] to schedule PT visits but was told they do not have an order from our office. Patient would appreciate a clinical person checking the order in his chart and faxing it to their location if needed.

## 2024-11-06 NOTE — TELEPHONE ENCOUNTER
Faxed order to central scheduling however I do see a OP PT order placed on 11.01 for 20 visits.    Not sure if office didn't check auth tab?

## 2024-11-21 ENCOUNTER — OFFICE VISIT (OUTPATIENT)
Dept: PHYSICAL THERAPY | Age: 64
End: 2024-11-21
Attending: ORTHOPAEDIC SURGERY
Payer: COMMERCIAL

## 2024-11-21 PROCEDURE — 97161 PT EVAL LOW COMPLEX 20 MIN: CPT

## 2024-11-21 NOTE — PROGRESS NOTES
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 Pt had been coming for PT earlier this year. Pt was doing well, was still having some discomfort with having to take off quickly or with walking his dog. Pt received a PRP 10/28 and is feeling better. Pt has been able to walk his dog and had to perform a slow jog due to rain and did not have a flare up in the R achilles.       Pt describes pain level current 0/10. Slight discomfort R lateral achilles sheath.    Current functional limitations include  none at the moment. Does not have any plan to return to running.     Werner describes prior level of function I. Pt goals include  cont with his HEP at this time.  Past medical history was reviewed with Werner. Significant findings include   Past Medical History:    Back problem    Calculus of kidney    Depression    Extrinsic asthma, unspecified    Hearing loss    High cholesterol    History of COVID-19    Had cough, \"flu like\"  S/S x 1 week. No hospitalization.    Other and unspecified hyperlipidemia    Visual impairment    glasses    Wears glasses        ASSESSMENT  Werner presents to physical therapy evaluation with primary c/o R achilles tendonitis. The results of the objective tests and measures show none at this time.  Functional deficits include but are not limited to none at this time.  Signs and symptoms are consistent with diagnosis of R achilles tendonitis. Pt and PT discussed evaluation findings, pathology, POC and HEP.  Pt voiced understanding and performs HEP correctly without reported pain. Skilled Physical Therapy is medically necessary to address the above  impairments and reach functional goals.     OBJECTIVE:   Observation:  rounded shoulders with forward head posture   Palpation:  none today   Sensation:  intact- no foot numbness/ tingling R today . Pt has history of sciatica that is intermittent on the R, L intermittent in the post thigh. Today no symptoms     AROM: (* denotes performed with pain)  Hip Knee Foot/Ankle    WFL WFL    DF: R 25; L 25  PF: R 45; L 45  INV: R 35; L 35  EV: R 25; L 25  Great toe ext: R NA; L NA     Accessory motion:  normal today TCJ    Flexibility:  Hip Flexor: R min, L min  Hamstrings: R min; L min  Piriformis: R min; L min  Quads: R min; L min  Gastroc-soleus: R min; L min    Strength/MMT: (* denotes performed with pain)  Hip Knee Foot/Ankle   Flexion: R 5/5; L 5/5  Extension: R 5/5; L 5/5  Abduction: R 5/5; L 5/5  ER: R 5/5; L 5/5  IR: R 5/5; L 5/5 5/5    5/5 B      Special tests:   NA    Gait: pt ambulates on level ground with  normal no assistive device  Balance: SLS: R 30 sec, L 30 sec    Today’s Treatment and Response:  re assessment today with STM on the lateral sheath of the R achilles   Pt education was provided on exam findings, treatment diagnosis, treatment plan, expectations, and prognosis. Pt was also provided recommendations for  review of HEP .  Patient was instructed in and issued a HEP for:  gastroc stretch  Rocker board  Using bike at home/ swimming  Balance exercises   Hip exercises issued previously   Roller for the R piriformis       Charges: PT Eval Low Complexity,       Total Timed Treatment: 45 min     Total Treatment Time: 45 min     Based on clinical rationale and outcome measures, this evaluation involved Low Complexity decision making due to 1-2 personal factors/comorbidities, 3 body structures involved/activity limitations, and evolving symptoms including currently no pain in the R ac.  PLAN OF CARE:    Goals: (to be met in 0 visits)   No goals at this time. Pt has no further appts. Will follow up with me  via email or phone to give an update. Will discharge from PT at that time if he is still symptom free    Frequency / Duration:     Education or treatment limitation:   Rehab Potential:    LEFS Score  No data recorded    Patient/Family/Caregiver was advised of these findings, precautions, and treatment options and has agreed to actively participate in planning and for this course of care.    Thank you for your referral. Please co-sign or sign and return this letter via fax as soon as possible to [phone]. If you have any questions, please contact me at Dept: [phone]    Sincerely,  Electronically signed by therapist: Meera Winslow, PT  Physician's certification required: Yes  I certify the need for these services furnished under this plan of treatment and while under my care.    X___________________________________________________ Date____________________    Certification From: 11/21/2024  To:2/19/2025

## 2024-12-12 ENCOUNTER — OFFICE VISIT (OUTPATIENT)
Dept: PHYSICAL THERAPY | Age: 64
End: 2024-12-12
Attending: ORTHOPAEDIC SURGERY
Payer: COMMERCIAL

## 2024-12-12 PROCEDURE — 97140 MANUAL THERAPY 1/> REGIONS: CPT

## 2024-12-12 PROCEDURE — 97110 THERAPEUTIC EXERCISES: CPT

## 2024-12-12 NOTE — PROGRESS NOTES
Diagnosis:   Achilles tendinosis of right ankle (M67.873)         Referring Provider: Jacqui Brar  Date of Evaluation:    11/21/24    Precautions:   none Next MD visit:   none scheduled  Date of Surgery: n/a   Insurance Primary/Secondary[place] [employer] [place] / N/A     # Auth Visits: 8             Subjective:  pt did have a day where he had to run to catch the train. Increased symptoms in the achilles on the R about an hour after he got to work, nothing the next day     Pain: 1/10 R achilles       Objective:  full ROM on the R   + slight soreness with manual/ tool, but much less since having the PRP on the R achilles       Assessment:  over all pt is still making gains in his symptoms. Discussed treadmill walk: run times for start getting back into running. He still wants to try to get back to playing soccer.       Goals:   No goals at this time. Pt has no further appts. Will follow up with me via email or phone to give an update. Will discharge from PT at that time if he is still symptom free   12/12/24-  pt will attempt jog/ walk program on the treadmill with 0-1 on the R achilles following  Pt will demo SL/B jumps next appt with out any symptoms R achilles    Plan:  plan to see pt for one more appt on 12/26. Will re assess at that time. Overall he has made gains in his symptoms, looking to progress him to get him back to his sport.   Date: 12/12/2024  TX#: 2/8 Date:                 TX#: 3/ Date:                 TX#: 4/ Date:                 TX#: 5/ Date:   Tx#: 6/   Nustep 5 mins       Rocker board 10 x 2   Bosu ball step ups ad side ways 10 x no HHA       Foam PF 10 x   TRX 10 x alternative lunges 10 x each side       STM / tool R achilles   PROM 10 mins  Stretches on the bars gastroc/ soleus 3 x 30 secs        HEP:  see above     Charges: EX 2 MT 1        Total Timed Treatment: 45 min  Total Treatment Time: 45 min

## 2024-12-18 ENCOUNTER — OFFICE VISIT (OUTPATIENT)
Facility: CLINIC | Age: 64
End: 2024-12-18
Payer: COMMERCIAL

## 2024-12-18 VITALS — RESPIRATION RATE: 16 BRPM | DIASTOLIC BLOOD PRESSURE: 62 MMHG | HEART RATE: 78 BPM | SYSTOLIC BLOOD PRESSURE: 112 MMHG

## 2024-12-18 DIAGNOSIS — H93.13 TINNITUS OF BOTH EARS: ICD-10-CM

## 2024-12-18 DIAGNOSIS — R51.9 NONINTRACTABLE HEADACHE, UNSPECIFIED CHRONICITY PATTERN, UNSPECIFIED HEADACHE TYPE: Primary | ICD-10-CM

## 2024-12-18 PROCEDURE — 3078F DIAST BP <80 MM HG: CPT | Performed by: OTHER

## 2024-12-18 PROCEDURE — 3074F SYST BP LT 130 MM HG: CPT | Performed by: OTHER

## 2024-12-18 PROCEDURE — 99214 OFFICE O/P EST MOD 30 MIN: CPT | Performed by: OTHER

## 2024-12-18 NOTE — PROGRESS NOTES
Neurology History & Physical     ASSESSMENT & PLAN:      ICD-10-CM    1. Nonintractable headache, unspecified chronicity pattern, unspecified headache type  R51.9       2. Tinnitus of both ears  H93.13         Ongoing tinnitus, unimproved.  I advised I don't have a specific cause or treatment recommendation.  Headaches improved.  CTA unremarkable.  CTM.      RLE numbness, but normal exam, has mild back pain which responds to stretches.  Has known lumbar polyradic radiographically, but clinically not active.  CTM.    Return for concerns as needed.       ~~~~~~~~~~~~~~~~~~~~~~~~~~~    CHIEF COMPLAINT / REASON FOR VISIT:    Chief Complaint   Patient presents with    Neurologic Problem     Headaches and tinnitus same since last visit.      HISTORY OBTAINED FROM:  Patient and others as above  Chart review    HISTORY:  Werner Pakrs is a 64 year old male with 10+ years of bilateral tinnitus, gradually worsening.  Also has mild SNHL, has seen ENT for this.  Does have intermittent imbalance, very rarely and not out of the ordinary, if he stands up too fast.  He plays soccer without issue.    Sister had a stroke due to cerebral aneurysm rupture, and brother had an asymptomatic aneurysm as well.  So he is concerned about having one.  Does have right sided headaches, mild and infrequent, brief and non requiring medications.  Had some blurry vision which was corrected by new glasses Rx.    He also has RLE numbness and tingling (lateral thigh and sole at times).  No back pain currently, though in past he had back pain and ? sciatica with loss of left knee reflex which presumably recovered.    INTERIM HISTORY:  Headaches improved, but far less common now.  Tinnitus not improved.  Ongoing RLE numbness and back pain, stretches do help.    DATA REVIEWED:  As documented in the history    Nov 2024  Derm note  PT note    Oct 2024  CTA head unremarkable (I independently analyzed and interpreted the above, and I agree with the  reading physician report(s).)  Creat 1.00, GFR 84    July 2024  PCP note    April 2024  CUS unremarkable     Jan 2024  BMP unremarkable     Dec 2023  ENT note     Sept 2023  MRI L spine \"Moderate degenerative changes in the lumbar spine most notable at L3-L4, L4-5, and L5-S1.  There are prominent foraminal disc protrusions at L3-L4 and L4-5 which could impinge the exiting left L3 and/or left L4 nerve roots.  There is advanced bilateral foraminal narrowing at L5-S1. \"    PHYSICAL EXAMINATION:  /62   Pulse 78   Resp 16     Gen: in NAD  MSE: nl attn/conc, nl language, nl fund of knowledge  CN: EOMI, VFF, nl facial mvmt, nl palate, nl tongue  Motor: 5/5 x4  DTR: 2+ BUE and BLE  Sensory:    Coord: nl FTN b/I  Gait: normal    Allergies   Allergen Reactions    Sulfamethoxazole W/Trimethoprim TONGUE SWELLING       Current medications:   doxycycline 100 MG Oral Cap Take 1 capsule (100 mg total) by mouth daily. 90 capsule 1    neomycin-polymyxin-dexamethasone 3.5-56888-4.1 Ophthalmic Ointment Place 1 Application  into both eyes as needed. 3.5 g 5    DULoxetine 20 MG Oral Cap DR Particles Take 3 capsules (60 mg total) by mouth nightly. TAKE AT BEDTIME 180 capsule 0    Potassium Citrate ER (UROCIT-K 15) 15 MEQ (1620 MG) Oral Tab CR Take 1 tablet by mouth in the morning and 1 tablet before bedtime. 90 tablet 5    Coenzyme Q10 100 MG Oral Cap 1 capsule with a meal Orally Once a day      levothyroxine 50 MCG Oral Tab 1 tablet in the morning on an empty stomach Orally Once a day for 90 days      Multiple Vitamin (MULTI VITAMIN) Oral Tab Take 1 tablet by mouth daily.      tadalafil 5 MG Oral Tab take 1 tablet by mouth daily as needed 1 hour prior to intercourse as directed Orally as directed for 90 days      simvastatin 20 MG Oral Tab Take 1 tablet (20 mg total) by mouth every evening. Will alternate between 1-2 every day      loratadine 10 MG Oral Tab Take 1 tablet (10 mg total) by mouth daily.         Past Medical History:     Back problem    Calculus of kidney    Depression    Extrinsic asthma, unspecified    Hearing loss    High cholesterol    History of COVID-19    Had cough, \"flu like\"  S/S x 1 week. No hospitalization.    Other and unspecified hyperlipidemia    Visual impairment    glasses    Wears glasses       Past Surgical History:   Procedure Laterality Date    Colonoscopy  7-31-12 [facility]    Colonoscopy      Oral surgery procedure      [facility]    Other surgical history      varicose vein to left testicle    Other surgical history      sinus sx    Other surgical history  03/21/2019    Cysto/ Stent Removal [facility]    Removal of kidney stone Left 02/02/2024    Tonsillectomy         Social History     Socioeconomic History    Marital status:    Tobacco Use    Smoking status: Former     Types: Cigarettes     Passive exposure: Past (1980s-1990s from ex wife)    Smokeless tobacco: Never    Tobacco comments:     Social 5 cigarettes/week quit in the 1980s   Vaping Use    Vaping status: Never Used   Substance and Sexual Activity    Alcohol use: Yes     Comment: occasionally    Drug use: No   Other Topics Concern    Caffeine Concern Yes     Comment: coffee 2 cups daily    Exercise Yes     Comment: 1-2x per week, walking dog too       Family History   Problem Relation Age of Onset    Heart Attack Father     Mental Disorder Mother        Savanna Joseph MD, FAES, FAAN  Board-Certified in Neurology, Epilepsy, and Clinical Neurophysiology  [facility]

## 2024-12-18 NOTE — PATIENT INSTRUCTIONS
After your visit at the [facility] office  today,  please direct any follow up questions or medication needs to the staff in our  [place] office so that your concerns may be promptly addressed.  We are available through CX or at the numbers below:    The phone number is:   [phone] option #1    The fax number is:  [phone]    Your pharmacy should also send any requests electronically to the [place] office.     Refill policies:    Allow 2-3 business days for refills; controlled substances may take longer.  Contact your pharmacy at least 5 days prior to running out of medication and have them send an electronic request or submit request through the “request refill” option in your CX account.  Refills are not addressed on weekends; covering physicians do not authorize routine medications on weekends.  No narcotics or controlled substances are refilled after noon on Fridays or by on call physicians.  By law, narcotics must be electronically prescribed.  A 30 day supply with no refills is the maximum allowed.  If your prescription is due for a refill, you may be due for a follow up appointment.  To best provide you care, patients receiving routine medications need to be seen at least once a year.  Patients receiving narcotic/controlled substance medications need to be seen at least once every 3 months.  In the event that your preferred pharmacy does not have the requested medication in stock (e.g. Backordered), it is your responsibility to find another pharmacy that has the requested medication available.  We will gladly send a new prescription to that pharmacy at your request.    Scheduling Tests:    If your physician has ordered radiology tests such as MRI or CT scans, please contact Central Scheduling at [phone] right away to schedule the test.  Once scheduled, the [facility] Centralized Referral Team will work with your insurance carrier to obtain pre-certification or prior  authorization.  Depending on your insurance carrier, approval may take 3-10 days.  It is highly recommended patients assure they have received an authorization before having a test performed.  If test is done without insurance authorization, patient may be responsible for the entire amount billed.      Precertification and Prior Authorizations:  If your physician has recommended that you have a procedure or additional testing performed the [facility] Centralized Referral Team will contact your insurance carrier to obtain pre-certification or prior authorization.    You are strongly encouraged to contact your insurance carrier to verify that your procedure/test has been approved and is a COVERED benefit.  Although the [facility] Centralized Referral Team does its due diligence, the insurance carrier gives the disclaimer that \"Although the procedure is authorized, this does not guarantee payment.\"    Ultimately the patient is responsible for payment.   Thank you for your understanding in this matter.  Paperwork Completion:  If you require FMLA or disability paperwork for your recovery, please make sure to either drop it off or have it faxed to our office at [phone]. Be sure the form has your name and date of birth on it.  The form will be faxed to our Forms Department and they will complete it for you.  There is a 25$ fee for all forms that need to be filled out.  Please be aware there is a 10-14 day turnaround time.  You will need to sign a release of information (OMAIRA) form if your paperwork does not come with one.  You may call the Forms Department with any questions at [phone].  Their fax number is [phone].

## 2024-12-26 ENCOUNTER — OFFICE VISIT (OUTPATIENT)
Dept: PHYSICAL THERAPY | Age: 64
End: 2024-12-26
Attending: ORTHOPAEDIC SURGERY
Payer: COMMERCIAL

## 2024-12-26 PROCEDURE — 97110 THERAPEUTIC EXERCISES: CPT

## 2024-12-26 NOTE — PROGRESS NOTES
Diagnosis:   Achilles tendinosis of right ankle (M67.873)         Referring Provider: Jacqui Brar  Date of Evaluation:    11/21/24    Precautions:   none Next MD visit:   none scheduled  Date of Surgery: n/a   Insurance Primary/Secondary: [place] / N/A     # Auth Visits: [id number]             Subjective:   been feeling good, not having time to get to the treadmill.       Pain: 0/ 10 R achilles       Objective:  full ROM on the R   + slight soreness with manual/ tool, but much less since having the PRP on the R achilles       Assessment:  today, could  jump symptom free in the R achilles. Will monitor to see how he felt later today or tomorrow in the R achilles. No discomfort in the R medial knee today with the exercises.   Goals:   No goals at this time. Pt has no further appts. Will follow up with me via email or phone to give an update. Will discharge from PT at that time if he is still symptom free   12/12/24-  pt will attempt jog/ walk program on the treadmill with 0-1 on the R achilles following  Pt will demo SL/B jumps next appt with out any symptoms R achilles- met 12/26/ 24     Plan:    cont with one more appt- will discharge at that time from PT.  Date: 12/12/2024  TX#: 2/8 Date:    12/26/24      TX#: 3/8 Date:                 TX#: 4/ Date:                 TX#: 5/ Date:   Tx#: 6/   Nustep 5 mins Nustep 5 mins       Rocker board 10 x 2   Bosu ball step ups ad side ways 10 x no HHA Rocker board 10 x 2  B and R only 10 x   Bosu step ups with pull through 10 x each side no hands  Up and overs bosu 10 x   Jumps B in bars 10 x    R only 10 x jumps       Foam PF 10 x   TRX 10 x alternative lunges 10 x each side STM R achilles 5 mins   PROM R ankle all planes 10 mins       STM / tool R achilles   PROM 10 mins  Stretches on the bars gastroc/ soleus 3 x 30 secs  Gastroc stretch in bars 10 x   [place] x 2   Side shuffle x 2   PF on foam with slow eccentric descent 15 x      HEP:  see above     Charges: EX  3      Total  Timed Treatment: 45 min  Total Treatment Time: 45 min

## 2024-12-31 ENCOUNTER — OFFICE VISIT (OUTPATIENT)
Dept: PHYSICAL THERAPY | Age: 64
End: 2024-12-31
Attending: ORTHOPAEDIC SURGERY
Payer: COMMERCIAL

## 2024-12-31 PROCEDURE — 97110 THERAPEUTIC EXERCISES: CPT

## 2024-12-31 NOTE — PROGRESS NOTES
Diagnosis:   Achilles tendinosis of right ankle (M67.873)         Referring Provider: Jacqui Brar  Date of Evaluation:    11/21/24    Precautions:   none Next MD visit:   none scheduled  Date of Surgery: n/a   Insurance Primary/Secondary: [place] / N/A     # Auth Visits: [id number]             Subjective:    Right great toe feels like something is in it, but did not step on anything.     Pain: 0/ 10 R achilles       Objective:  full ROM on the R   + slight soreness with manual/ tool, but much less since having the PRP on the R achilles       Assessment:   pt has met his goals at this time, review of stretches today. Did not see anything in the great toe, pt to watch at home. Discussed back to running program - TM to outside.   Goals:   No goals at this time. Pt has no further appts. Will follow up with me via email or phone to give an update. Will discharge from PT at that time if he is still symptom free   12/12/24-  pt will attempt jog/ walk program on the treadmill with 0-1 on the R achilles following  Pt will demo SL/B jumps next appt with out any symptoms R achilles- met 12/26/ 24      Plan:     discharge from PT-  will discharge with HEP   Date: 12/12/2024  TX#: 2/8 Date:    12/26/24      TX#: 3/8 Date:   12/31/24              TX#: 4/8 Date:                 TX#: 5/ Date:   Tx#: 6/   Nustep 5 mins Nustep 5 mins  Nustep 5 mins LE only      Rocker board 10 x 2   Bosu ball step ups ad side ways 10 x no HHA Rocker board 10 x 2  B and R only 10 x   Bosu step ups with pull through 10 x each side no hands  Up and overs bosu 10 x   Jumps B in bars 10 x    R only 10 x jumps  Rocker board B 10 x no hands  SL balance on mari disc no HHA 10 x   Bosu step ups 10 x each side no HHA  Over bosu 10 x no HHA  Jump in place  10 x   Jump side to side 10x   Skip/ side shuffle x 1        Foam PF 10 x   TRX 10 x alternative lunges 10 x each side STM R achilles 5 mins   PROM R ankle all planes 10 mins  Gastroc/ soleus stretch  2 x 30  secs  Hip flexor stretch 2 x 30 secs  HS stretch 2 x 30 secs      STM / tool R achilles   PROM 10 mins  Stretches on the bars gastroc/ soleus 3 x 30 secs  Gastroc stretch in bars 10 x   [place] x 2   Side shuffle x 2   PF on foam with slow eccentric descent 15 x      HEP:  see above     Charges: EX  3      Total Timed Treatment: 45 min  Total Treatment Time: 45 min

## 2025-02-10 ENCOUNTER — LAB REQUISITION (OUTPATIENT)
Dept: LAB | Facility: HOSPITAL | Age: 65
End: 2025-02-10
Payer: COMMERCIAL

## 2025-02-10 DIAGNOSIS — Z00.00 ENCOUNTER FOR GENERAL ADULT MEDICAL EXAMINATION WITHOUT ABNORMAL FINDINGS: ICD-10-CM

## 2025-02-10 DIAGNOSIS — E53.8 DEFICIENCY OF OTHER SPECIFIED B GROUP VITAMINS: ICD-10-CM

## 2025-02-10 DIAGNOSIS — E78.5 HYPERLIPIDEMIA, UNSPECIFIED: ICD-10-CM

## 2025-02-10 DIAGNOSIS — E03.9 HYPOTHYROIDISM, UNSPECIFIED: ICD-10-CM

## 2025-02-10 LAB
ALBUMIN SERPL-MCNC: 4.3 G/DL (ref 3.2–4.8)
ALBUMIN/GLOB SERPL: 1.7 {RATIO} (ref 1–2)
ALP LIVER SERPL-CCNC: 71 U/L
ALT SERPL-CCNC: 44 U/L
ANION GAP SERPL CALC-SCNC: 7 MMOL/L (ref 0–18)
AST SERPL-CCNC: 29 U/L (ref ?–34)
BASOPHILS # BLD AUTO: 0.05 X10(3) UL (ref 0–0.2)
BASOPHILS NFR BLD AUTO: 0.9 %
BILIRUB SERPL-MCNC: 0.7 MG/DL (ref 0.2–1.1)
BUN BLD-MCNC: 12 MG/DL (ref 9–23)
CALCIUM BLD-MCNC: 9.3 MG/DL (ref 8.7–10.6)
CHLORIDE SERPL-SCNC: 103 MMOL/L (ref 98–112)
CHOLEST SERPL-MCNC: 180 MG/DL (ref ?–200)
CO2 SERPL-SCNC: 28 MMOL/L (ref 21–32)
CREAT BLD-MCNC: 0.93 MG/DL
EGFRCR SERPLBLD CKD-EPI 2021: 92 ML/MIN/1.73M2 (ref 60–?)
EOSINOPHIL # BLD AUTO: 0.13 X10(3) UL (ref 0–0.7)
EOSINOPHIL NFR BLD AUTO: 2.3 %
ERYTHROCYTE [DISTWIDTH] IN BLOOD BY AUTOMATED COUNT: 13.1 %
GLOBULIN PLAS-MCNC: 2.5 G/DL (ref 2–3.5)
GLUCOSE BLD-MCNC: 91 MG/DL (ref 70–99)
HCT VFR BLD AUTO: 44.2 %
HDLC SERPL-MCNC: 48 MG/DL (ref 40–59)
HGB BLD-MCNC: 14.6 G/DL
IMM GRANULOCYTES # BLD AUTO: 0.02 X10(3) UL (ref 0–1)
IMM GRANULOCYTES NFR BLD: 0.3 %
LDLC SERPL CALC-MCNC: 112 MG/DL (ref ?–100)
LYMPHOCYTES # BLD AUTO: 1.69 X10(3) UL (ref 1–4)
LYMPHOCYTES NFR BLD AUTO: 29.5 %
MCH RBC QN AUTO: 32.4 PG (ref 26–34)
MCHC RBC AUTO-ENTMCNC: 33 G/DL (ref 31–37)
MCV RBC AUTO: 98.2 FL
MONOCYTES # BLD AUTO: 0.5 X10(3) UL (ref 0.1–1)
MONOCYTES NFR BLD AUTO: 8.7 %
NEUTROPHILS # BLD AUTO: 3.34 X10 (3) UL (ref 1.5–7.7)
NEUTROPHILS # BLD AUTO: 3.34 X10(3) UL (ref 1.5–7.7)
NEUTROPHILS NFR BLD AUTO: 58.3 %
NONHDLC SERPL-MCNC: 132 MG/DL (ref ?–130)
OSMOLALITY SERPL CALC.SUM OF ELEC: 285 MOSM/KG (ref 275–295)
PLATELET # BLD AUTO: 333 10(3)UL (ref 150–450)
POTASSIUM SERPL-SCNC: 4.6 MMOL/L (ref 3.5–5.1)
PROT SERPL-MCNC: 6.8 G/DL (ref 5.7–8.2)
RBC # BLD AUTO: 4.5 X10(6)UL
SODIUM SERPL-SCNC: 138 MMOL/L (ref 136–145)
TRIGL SERPL-MCNC: 111 MG/DL (ref 30–149)
TSI SER-ACNC: 3.43 UIU/ML (ref 0.55–4.78)
VIT B12 SERPL-MCNC: 327 PG/ML (ref 211–911)
VLDLC SERPL CALC-MCNC: 19 MG/DL (ref 0–30)
WBC # BLD AUTO: 5.7 X10(3) UL (ref 4–11)

## 2025-02-10 PROCEDURE — 84443 ASSAY THYROID STIM HORMONE: CPT | Performed by: FAMILY MEDICINE

## 2025-02-10 PROCEDURE — 80061 LIPID PANEL: CPT | Performed by: FAMILY MEDICINE

## 2025-02-10 PROCEDURE — 80053 COMPREHEN METABOLIC PANEL: CPT | Performed by: FAMILY MEDICINE

## 2025-02-10 PROCEDURE — 82607 VITAMIN B-12: CPT | Performed by: FAMILY MEDICINE

## 2025-02-10 PROCEDURE — 85025 COMPLETE CBC W/AUTO DIFF WBC: CPT | Performed by: FAMILY MEDICINE

## 2025-05-12 ENCOUNTER — ORDER TRANSCRIPTION (OUTPATIENT)
Dept: ADMINISTRATIVE | Facility: HOSPITAL | Age: 65
End: 2025-05-12

## 2025-05-12 DIAGNOSIS — Z13.6 SCREENING FOR CARDIOVASCULAR CONDITION: Primary | ICD-10-CM

## 2025-05-13 ENCOUNTER — ORDER TRANSCRIPTION (OUTPATIENT)
Dept: PHYSICAL THERAPY | Facility: HOSPITAL | Age: 65
End: 2025-05-13

## 2025-05-13 DIAGNOSIS — M54.9 BACK PAIN: ICD-10-CM

## 2025-05-13 DIAGNOSIS — M54.2 NECK PAIN: Primary | ICD-10-CM

## 2025-05-14 ENCOUNTER — HOSPITAL ENCOUNTER (OUTPATIENT)
Dept: ULTRASOUND IMAGING | Facility: HOSPITAL | Age: 65
Discharge: HOME | End: 2025-05-14
Attending: FAMILY MEDICINE
Payer: COMMERCIAL

## 2025-05-14 DIAGNOSIS — I65.29 STENOSIS OF CAROTID ARTERY: ICD-10-CM

## 2025-05-14 PROCEDURE — 93880 EXTRACRANIAL BILAT STUDY: CPT | Performed by: FAMILY MEDICINE

## 2025-06-16 ENCOUNTER — ORDER TRANSCRIPTION (OUTPATIENT)
Dept: ADMINISTRATIVE | Facility: HOSPITAL | Age: 65
End: 2025-06-16

## 2025-06-16 DIAGNOSIS — Z13.6 SCREENING FOR CARDIOVASCULAR CONDITION: Primary | ICD-10-CM

## 2025-06-20 ENCOUNTER — HOSPITAL ENCOUNTER (OUTPATIENT)
Dept: CT IMAGING | Facility: HOSPITAL | Age: 65
Discharge: HOME | End: 2025-06-20
Attending: FAMILY MEDICINE

## 2025-06-20 DIAGNOSIS — Z13.6 SCREENING FOR CARDIOVASCULAR CONDITION: ICD-10-CM

## 2025-06-21 ENCOUNTER — LAB REQUISITION (OUTPATIENT)
Dept: LAB | Facility: HOSPITAL | Age: 65
End: 2025-06-21
Payer: COMMERCIAL

## 2025-06-21 DIAGNOSIS — Z00.00 ENCOUNTER FOR GENERAL ADULT MEDICAL EXAMINATION WITHOUT ABNORMAL FINDINGS: ICD-10-CM

## 2025-06-21 LAB
ALBUMIN SERPL-MCNC: 4.6 G/DL (ref 3.2–4.8)
ALBUMIN/GLOB SERPL: 2.1 {RATIO} (ref 1–2)
ALP LIVER SERPL-CCNC: 80 U/L (ref 45–117)
ALT SERPL-CCNC: 53 U/L (ref 10–49)
ANION GAP SERPL CALC-SCNC: 9 MMOL/L (ref 0–18)
AST SERPL-CCNC: 35 U/L (ref ?–34)
BASOPHILS # BLD AUTO: 0.05 X10(3) UL (ref 0–0.2)
BASOPHILS NFR BLD AUTO: 0.8 %
BILIRUB SERPL-MCNC: 0.6 MG/DL (ref 0.2–1.1)
BUN BLD-MCNC: 14 MG/DL (ref 9–23)
CALCIUM BLD-MCNC: 9.4 MG/DL (ref 8.7–10.6)
CHLORIDE SERPL-SCNC: 104 MMOL/L (ref 98–112)
CHOLEST SERPL-MCNC: 150 MG/DL (ref ?–200)
CO2 SERPL-SCNC: 28 MMOL/L (ref 21–32)
CREAT BLD-MCNC: 1.06 MG/DL (ref 0.7–1.3)
EGFRCR SERPLBLD CKD-EPI 2021: 78 ML/MIN/1.73M2 (ref 60–?)
EOSINOPHIL # BLD AUTO: 0.15 X10(3) UL (ref 0–0.7)
EOSINOPHIL NFR BLD AUTO: 2.5 %
ERYTHROCYTE [DISTWIDTH] IN BLOOD BY AUTOMATED COUNT: 13.2 %
GLOBULIN PLAS-MCNC: 2.2 G/DL (ref 2–3.5)
GLUCOSE BLD-MCNC: 98 MG/DL (ref 70–99)
HCT VFR BLD AUTO: 45.3 % (ref 39–53)
HDLC SERPL-MCNC: 47 MG/DL (ref 40–59)
HGB BLD-MCNC: 14.8 G/DL (ref 13–17.5)
IMM GRANULOCYTES # BLD AUTO: 0.02 X10(3) UL (ref 0–1)
IMM GRANULOCYTES NFR BLD: 0.3 %
LDLC SERPL CALC-MCNC: 74 MG/DL (ref ?–100)
LYMPHOCYTES # BLD AUTO: 2.06 X10(3) UL (ref 1–4)
LYMPHOCYTES NFR BLD AUTO: 34.1 %
MCH RBC QN AUTO: 32.2 PG (ref 26–34)
MCHC RBC AUTO-ENTMCNC: 32.7 G/DL (ref 31–37)
MCV RBC AUTO: 98.5 FL (ref 80–100)
MONOCYTES # BLD AUTO: 0.49 X10(3) UL (ref 0.1–1)
MONOCYTES NFR BLD AUTO: 8.1 %
NEUTROPHILS # BLD AUTO: 3.27 X10 (3) UL (ref 1.5–7.7)
NEUTROPHILS # BLD AUTO: 3.27 X10(3) UL (ref 1.5–7.7)
NEUTROPHILS NFR BLD AUTO: 54.2 %
NONHDLC SERPL-MCNC: 103 MG/DL (ref ?–130)
OSMOLALITY SERPL CALC.SUM OF ELEC: 292 MOSM/KG (ref 275–295)
PLATELET # BLD AUTO: 356 10(3)UL (ref 150–450)
POTASSIUM SERPL-SCNC: 4.4 MMOL/L (ref 3.5–5.1)
PROT SERPL-MCNC: 6.8 G/DL (ref 5.7–8.2)
PSA SERPL-MCNC: 1.29 NG/ML (ref ?–4)
RBC # BLD AUTO: 4.6 X10(6)UL (ref 3.8–5.8)
SODIUM SERPL-SCNC: 141 MMOL/L (ref 136–145)
TRIGL SERPL-MCNC: 168 MG/DL (ref 30–149)
TSI SER-ACNC: 2.72 UIU/ML (ref 0.55–4.78)
VIT B12 SERPL-MCNC: 377 PG/ML (ref 211–911)
VLDLC SERPL CALC-MCNC: 26 MG/DL (ref 0–30)
WBC # BLD AUTO: 6 X10(3) UL (ref 4–11)

## 2025-06-21 PROCEDURE — 84443 ASSAY THYROID STIM HORMONE: CPT | Performed by: FAMILY MEDICINE

## 2025-06-21 PROCEDURE — 82607 VITAMIN B-12: CPT | Performed by: FAMILY MEDICINE

## 2025-06-21 PROCEDURE — 84153 ASSAY OF PSA TOTAL: CPT | Performed by: FAMILY MEDICINE

## 2025-06-21 PROCEDURE — 80053 COMPREHEN METABOLIC PANEL: CPT | Performed by: FAMILY MEDICINE

## 2025-06-21 PROCEDURE — 80061 LIPID PANEL: CPT | Performed by: FAMILY MEDICINE

## 2025-06-21 PROCEDURE — 85025 COMPLETE CBC W/AUTO DIFF WBC: CPT | Performed by: FAMILY MEDICINE

## 2025-06-24 ENCOUNTER — OFFICE VISIT (OUTPATIENT)
Facility: LOCATION | Age: 65
End: 2025-06-24
Payer: COMMERCIAL

## 2025-06-24 DIAGNOSIS — R44.8 FACIAL PRESSURE: ICD-10-CM

## 2025-06-24 DIAGNOSIS — H91.93 BILATERAL HEARING LOSS, UNSPECIFIED HEARING LOSS TYPE: ICD-10-CM

## 2025-06-24 DIAGNOSIS — H93.13 TINNITUS OF BOTH EARS: ICD-10-CM

## 2025-06-24 DIAGNOSIS — J32.4 CHRONIC PANSINUSITIS: Primary | ICD-10-CM

## 2025-06-24 PROCEDURE — 31231 NASAL ENDOSCOPY DX: CPT | Performed by: OTOLARYNGOLOGY

## 2025-06-24 PROCEDURE — 99214 OFFICE O/P EST MOD 30 MIN: CPT | Performed by: OTOLARYNGOLOGY

## 2025-06-24 NOTE — PROGRESS NOTES
Otolaryngology Consultation Note     HPI: 66 y/o M previously seen for CRS, HL. Here for follow up. Recently traveled and noted severe left frontal pressure while on the airplane. Pressure persisted for 1-2 days after he returned, now resolved. Not on any nasal sprays or saline rinses. Underwent ESS by Dr. Thomas in 2010. No sinus infections since then. Positive bilateral hearing loss and tinnitus. Feels tinnitus has increased in intensity. Underwent audiogram 12/2023. Would like to consider hearing aids. No other new complaints.      Past Medical History: Past Medical History[1]     Past Surgical History: Past Surgical History[2]     Medication: Scheduled Meds:Scheduled Medications[3]  Continuous Infusions:Medication Infusions[4]  PRN Meds:.PRN Medications[5]     Allergies:  Allergies[6]  Pertinent Family History: Family History[7]     Pertinent Social History:   Social History     Socioeconomic History    Marital status:      Spouse name: Not on file    Number of children: Not on file    Years of education: Not on file    Highest education level: Not on file   Occupational History    Not on file   Tobacco Use    Smoking status: Former     Types: Cigarettes     Passive exposure: Past (1980s-1990s from ex wife)    Smokeless tobacco: Never    Tobacco comments:     Social 5 cigarettes/week quit in the 1980s   Vaping Use    Vaping status: Never Used   Substance and Sexual Activity    Alcohol use: Yes     Comment: occasionally    Drug use: No    Sexual activity: Not on file   Other Topics Concern     Service Not Asked    Blood Transfusions Not Asked    Caffeine Concern Yes     Comment: coffee 2 cups daily    Occupational Exposure Not Asked    Hobby Hazards Not Asked    Sleep Concern Not Asked    Stress Concern Not Asked    Weight Concern Not Asked    Special Diet Not Asked    Back Care Not Asked    Exercise Yes     Comment: 1-2x per week, walking dog too    Bike Helmet Not Asked    Seat Belt Not Asked     Self-Exams Not Asked   Social History Narrative    Not on file     Social Drivers of Health     Food Insecurity: Not on file   Transportation Needs: Not on file   Stress: Not on file   Housing Stability: Not on file        Review of Systems:  Constitutional: Negative.  HENT: see above  Eyes: Negative.  Respiratory: Negative.  Cardiovascular: Negative.  Gastrointestinal: Negative.  Musculoskeletal: Negative.  Skin: Negative.  Renal: Negative  Endocrine: Negative  Psychiatric/Behavioral: Negative.     Physical Examination:  Vitals: There were no vitals taken for this visit.     General: Breathing comfortably on room air while sitting up. Able to communicate verbally. Voice normal. Normal appearing body habitus.     Musculoskeletal: Head: Atraumatic and normocephalic.     Neck: Full ROM and able to extend without issues     Ears: External auditory canals clear with no evidence of significant cerumen or stenosis. Tympanic membranes visible with no evidence of retraction or perforation. No evidence of middle ear effusion bilaterally.     Nose: No sinus tenderness bilaterally upon palpation. No obvious nasal deformity. No masses, rhinorrhea, epistaxis. Nasal septum, mucosa, and turbinates appear normal.     Eyes: Extraocular movements intact and pupils equally reactive to light stimulus. No spontaneous or gaze-evoked nystagmus. No proptosis or ecchymosis. VFI.     Lymphatic: No significant cervical lymphadenopathy noted.     Neuro: CN 7 intact with symmetric mobility and strength. No loss of facial sensation.     Skin: Dry, normal turgor, normal color.     Psych: Alert and oriented to person/place/time. Normal affect, amiable     Procedure:  Otolaryngology Procedure     Patient name: Werner Parks     YOB: 1952     Procedure: Flexible Fiberoptic Nasal Endoscopy     Indication: facial pressure, h/o CRS     Anesthesia: Topical 1% lidocaine with oxymetazoline     Surgeon: Pippa Galeana MD      Procedure detail: Benefits and risks discussed with patient, which include bleeding, pain, infection, damage to surrounding structures. Agreement obtained from patient. Patient, while sitting up, was anesthetized with topical anesthetic. This was allowed to sit for 5 minutes. A lubricated flexible fiberoptic endoscopy was inserted through the nasal cavity to the level of the nasopharynx with findings as follows: No masses or lesions in nasal cavities; bilateral inferior, middle and superior turbinates intact; bilateral inferior, middle and superior meati clear; bilateral ethmoid cavities and maxillary antrostomies patent; bilateral sphenoethmoid recesses clear. Nasopharynx clear  Scope was then removed and patient tolerated to procedure well without complication.     Dispo: Patient instructed to follow up as instructed within assessment and plan portion of this notation.       Assessment/Plan:     CRS: no e/o infection on exam today. Recommend saline rinses BID.   Facial pressure: currently asymptomatic. No pathology appreciated on nasal endoscopy today.  Bilateral HL: audiogram on return to clinic  Bilateral tinnitus: recommend masking techniques.        Pippa Galeana MD         [1]   Past Medical History:   Back problem    Calculus of kidney    Depression    Extrinsic asthma, unspecified    Hearing loss    High cholesterol    History of COVID-19    Had cough, \"flu like\"  S/S x 1 week. No hospitalization.    Other and unspecified hyperlipidemia    Visual impairment    glasses    Wears glasses   [2]   Past Surgical History:  Procedure Laterality Date    Colonoscopy  7-31-12 [facility]    Colonoscopy      Oral surgery procedure      [facility]    Other surgical history      varicose vein to left testicle    Other surgical history      sinus sx    Other surgical history  03/21/2019    Cysto/ Stent Removal [facility]    Removal of kidney stone Left 02/02/2024    Tonsillectomy     [3] [4] [5] [6]    Allergies  Allergen Reactions    Sulfamethoxazole W/Trimethoprim TONGUE SWELLING   [7]   Family History  Problem Relation Age of Onset    Heart Attack Father     Mental Disorder Mother

## 2025-07-22 RX ORDER — POTASSIUM CITRATE 15 MEQ/1
TABLET, EXTENDED RELEASE ORAL
Qty: 180 TABLET | Refills: 0 | OUTPATIENT
Start: 2025-07-22

## 2025-07-23 ENCOUNTER — TELEPHONE (OUTPATIENT)
Dept: SURGERY | Facility: CLINIC | Age: 65
End: 2025-07-23

## 2025-07-23 RX ORDER — POTASSIUM CITRATE 15 MEQ/1
1 TABLET, EXTENDED RELEASE ORAL 2 TIMES DAILY
Qty: 90 TABLET | Refills: 5 | OUTPATIENT
Start: 2025-07-23

## 2025-07-23 NOTE — TELEPHONE ENCOUNTER
Patient last office visit 1/04/24  Future office visit 7/25.  Patient denied refill until schedule follow-up appt. With Urology.  Patient agreeable with plan.  This Encounter is now closed.

## 2025-07-23 NOTE — TELEPHONE ENCOUNTER
Patient is out of prescription Potassium Citrate and has scheduled office visit for 7/25/2025 at 8:30 am. Please update at [phone],thanks.

## 2025-07-25 ENCOUNTER — TELEPHONE (OUTPATIENT)
Dept: SURGERY | Facility: CLINIC | Age: 65
End: 2025-07-25

## 2025-07-25 ENCOUNTER — OFFICE VISIT (OUTPATIENT)
Dept: SURGERY | Facility: CLINIC | Age: 65
End: 2025-07-25

## 2025-07-25 DIAGNOSIS — N47.1 PHIMOSIS: ICD-10-CM

## 2025-07-25 DIAGNOSIS — N20.0 KIDNEY STONE: Primary | ICD-10-CM

## 2025-07-25 PROCEDURE — G2211 COMPLEX E/M VISIT ADD ON: HCPCS | Performed by: UROLOGY

## 2025-07-25 PROCEDURE — 81003 URINALYSIS AUTO W/O SCOPE: CPT | Performed by: UROLOGY

## 2025-07-25 PROCEDURE — 99214 OFFICE O/P EST MOD 30 MIN: CPT | Performed by: UROLOGY

## 2025-07-25 RX ORDER — ATORVASTATIN CALCIUM 40 MG/1
40 TABLET, FILM COATED ORAL DAILY
COMMUNITY
Start: 2025-05-01

## 2025-07-25 RX ORDER — POTASSIUM CITRATE 15 MEQ/1
1 TABLET, EXTENDED RELEASE ORAL 2 TIMES DAILY
Qty: 90 TABLET | Refills: 5 | Status: SHIPPED | OUTPATIENT
Start: 2025-07-25

## 2025-07-25 NOTE — PROGRESS NOTES
Urology Clinic Note    Primary Care Provider:  Elif Aguilera MD     Chief Complaint:   Kidney stones    HPI:        Werner Parks is a 65 year old male with history HLD and NL sp ureteroscopy and ureteral stent placement 1/2/24 here for follow-up.    Previously seen by an outside urologist for many years.  He had ureteroscopy in 2019.  Stones are all calcium oxalate.  24-hour urine at that time with low citrate, high oxalate.  More recently, patient had a CT scan done and 2023 that showed stones in the left kidney, several stones all less than 7 mm.  Had a punctate right-sided stone as well.  24-hour urine testing showed low citrate at 292.  Blood work was normal.  He was started on potassium citrate 11/17/2023.  Patient initially saw me also for balanitis.  At that time he wanted to address his kidney stones first.  We presented for left ureteroscopy 1/3/2024.  All stones were treated.  Mild nephrocalcinosis was noted.  Stent was later removed.  Patient was last seen in January 2024.  He was then lost to follow-up.  A CT scan was done for follow-up for 624 that showed no nephrolithiasis or hydronephrosis in either kidney.  Otherwise normal findings.  Patient now presents to reestablish care.  Stone path--COD, COM  He has been taking potassium citrate.  Recent PSA was normal with PCP.  Potassium has been normal at 4.4.  Patient doing well today.  He reports ongoing mild phimosis, not bothersome.  He is doing well with his above medication.  He needs a refill.  Since last visit, he has had some changes to his health.  He has been worked up for carotid stenosis.      PSA:  Lab Results   Component Value Date    PSA 1.29 06/21/2025    PSA 1.41 11/29/2021    PSA 1.38 06/21/2018    PSAS 1.28 06/24/2019        History:     Past Medical History:    Back problem    Calculus of kidney    Depression    Extrinsic asthma, unspecified    Hearing loss    High cholesterol    History of COVID-19    Had cough,  \"flu like\"  S/S x 1 week. No hospitalization.    Other and unspecified hyperlipidemia    Visual impairment    glasses    Wears glasses       Past Surgical History:   Procedure Laterality Date    Colonoscopy  7-31-12 [facility]    Colonoscopy      Oral surgery procedure      [facility]    Other surgical history      varicose vein to left testicle    Other surgical history      sinus sx    Other surgical history  03/21/2019    Cysto/ Stent Removal [facility]seinandrein[facility]hel[facility]    Removal of kidney stone Left 02/02/2024    Tonsillectomy         Family History   Problem Relation Age of Onset    Heart Attack Father     Mental Disorder Mother        Social History     Socioeconomic History    Marital status:    Tobacco Use    Smoking status: Former     Types: Cigarettes     Passive exposure: Past (1980s-1990s from ex wife)    Smokeless tobacco: Never    Tobacco comments:     Social 5 cigarettes/week quit in the 1980s   Vaping Use    Vaping status: Never Used   Substance and Sexual Activity    Alcohol use: Yes     Comment: occasionally    Drug use: No   Other Topics Concern    Caffeine Concern Yes     Comment: coffee 2 cups daily    Exercise Yes     Comment: 1-2x per week, walking dog too       Medications (Active prior to today's visit):  Current Outpatient Medications   Medication Sig Dispense Refill    atorvastatin 40 MG Oral Tab Take 1 tablet (40 mg total) by mouth daily.      DULoxetine 20 MG Oral Cap DR Particles Take 3 capsules (60 mg total) by mouth daily. 270 capsule 0    doxycycline 100 MG Oral Cap Take 1 capsule (100 mg total) by mouth daily. 90 capsule 1    neomycin-polymyxin-dexamethasone 3.5-90640-9.1 Ophthalmic Ointment Place 1 Application  into both eyes as needed. 3.5 g 5    Potassium Citrate ER (UROCIT-K 15) 15 MEQ (1620 MG) Oral Tab CR Take 1 tablet by mouth in the morning and 1 tablet before bedtime. 90 tablet 5    Coenzyme Q10 100 MG Oral Cap 1 capsule with a meal Orally Once a day      levothyroxine 50 MCG  Oral Tab 1 tablet in the morning on an empty stomach Orally Once a day for 90 days      Multiple Vitamin (MULTI VITAMIN) Oral Tab Take 1 tablet by mouth daily.      tadalafil 5 MG Oral Tab take 1 tablet by mouth daily as needed 1 hour prior to intercourse as directed Orally as directed for 90 days      loratadine 10 MG Oral Tab Take 1 tablet (10 mg total) by mouth daily.         Allergies:  Allergies   Allergen Reactions    Sulfamethoxazole W/Trimethoprim TONGUE SWELLING       Review of Systems:   A comprehensive 10-point review of systems was completed.  Pertinent positives and negatives are noted in the the HPI.    Physical Exam:   CONSTITUTIONAL: Well developed, well nourished, in no acute distress  ABDOMEN: Soft, non-tender, non-distended     Assessment & Plan:   Phimosis  He will let me know if he is interested in further txt of this in future.     Kidney stones  Discussed history in detail.  Recurrent calcium kidney stones.  Currently doing well on Urocit-K. Refill sent.   Had surgery last year.  Repeat CT scan without stones.  No hydronephrosis.  We discussed healthy kidney stone diet in detail.  We should check a BMP soon to ensure no side effects from his potassium citrate.  Previous K was normal.  He will recheck 24-hour urine soon.  He should return with an ultrasound in about 4 to 6 months.      In total, 30 minutes were spent on this patient encounter (including chart review, patient history, physical, and counseling, documentation, and communication).    Paul Zapata MD  Staff Urologist  [facility]  Office: [phone]

## 2025-07-29 RX ORDER — POTASSIUM CITRATE 15 MEQ/1
1 TABLET, EXTENDED RELEASE ORAL 2 TIMES DAILY
Qty: 180 TABLET | Refills: 5 | Status: SHIPPED | OUTPATIENT
Start: 2025-07-29

## 2025-08-01 ENCOUNTER — LAB REQUISITION (OUTPATIENT)
Dept: LAB | Facility: HOSPITAL | Age: 65
End: 2025-08-01

## 2025-08-01 DIAGNOSIS — R74.8 ABNORMAL LEVELS OF OTHER SERUM ENZYMES: ICD-10-CM

## 2025-08-01 LAB
ALBUMIN SERPL-MCNC: 4.6 G/DL (ref 3.2–4.8)
ALBUMIN/GLOB SERPL: 2.2 (ref 1–2)
ALP LIVER SERPL-CCNC: 87 U/L (ref 45–117)
ALT SERPL-CCNC: 44 U/L (ref 10–49)
ANION GAP SERPL CALC-SCNC: 8 MMOL/L (ref 0–18)
AST SERPL-CCNC: 27 U/L (ref ?–34)
BILIRUB SERPL-MCNC: 0.7 MG/DL (ref 0.2–1.1)
BUN BLD-MCNC: 13 MG/DL (ref 9–23)
CALCIUM BLD-MCNC: 9.4 MG/DL (ref 8.7–10.6)
CHLORIDE SERPL-SCNC: 104 MMOL/L (ref 98–112)
CO2 SERPL-SCNC: 30 MMOL/L (ref 21–32)
CREAT BLD-MCNC: 1.04 MG/DL (ref 0.7–1.3)
DEPRECATED HBV CORE AB SER IA-ACNC: 31 NG/ML (ref 50–336)
EGFRCR SERPLBLD CKD-EPI 2021: 80 ML/MIN/1.73M2 (ref 60–?)
GLOBULIN PLAS-MCNC: 2.1 G/DL (ref 2–3.5)
GLUCOSE BLD-MCNC: 94 MG/DL (ref 70–99)
HBV CORE AB SERPL QL IA: NONREACTIVE
HBV SURFACE AB SER QL: REACTIVE
HBV SURFACE AB SERPL IA-ACNC: 29.92 MIU/ML
HBV SURFACE AG SER-ACNC: <0.1
HBV SURFACE AG SERPL QL IA: NONREACTIVE
IRON SATN MFR SERPL: 42 % (ref 20–50)
IRON SERPL-MCNC: 130 UG/DL (ref 65–175)
IRON SERPL-MCNC: 130 UG/DL (ref 65–175)
OSMOLALITY SERPL CALC.SUM OF ELEC: 294 MOSM/KG (ref 275–295)
POTASSIUM SERPL-SCNC: 4.4 MMOL/L (ref 3.5–5.1)
PROT SERPL-MCNC: 6.7 G/DL (ref 5.7–8.2)
SODIUM SERPL-SCNC: 142 MMOL/L (ref 136–145)
TOTAL IRON BINDING CAPACITY: 310 UG/DL (ref 250–425)
TRANSFERRIN SERPL-MCNC: 242 MG/DL (ref 215–365)

## 2025-08-01 PROCEDURE — 82728 ASSAY OF FERRITIN: CPT | Performed by: FAMILY MEDICINE

## 2025-08-01 PROCEDURE — 80053 COMPREHEN METABOLIC PANEL: CPT | Performed by: FAMILY MEDICINE

## 2025-08-01 PROCEDURE — 87340 HEPATITIS B SURFACE AG IA: CPT | Performed by: FAMILY MEDICINE

## 2025-08-01 PROCEDURE — 83540 ASSAY OF IRON: CPT | Performed by: FAMILY MEDICINE

## 2025-08-01 PROCEDURE — 86225 DNA ANTIBODY NATIVE: CPT | Performed by: FAMILY MEDICINE

## 2025-08-01 PROCEDURE — 86038 ANTINUCLEAR ANTIBODIES: CPT | Performed by: FAMILY MEDICINE

## 2025-08-01 PROCEDURE — 86704 HEP B CORE ANTIBODY TOTAL: CPT | Performed by: FAMILY MEDICINE

## 2025-08-01 PROCEDURE — 83550 IRON BINDING TEST: CPT | Performed by: FAMILY MEDICINE

## 2025-08-01 PROCEDURE — 86706 HEP B SURFACE ANTIBODY: CPT | Performed by: FAMILY MEDICINE

## 2025-08-05 LAB
DSDNA IGG SERPL IA-ACNC: 1.9 IU/ML (ref ?–10)
ENA AB SER QL IA: 0.2 UG/L (ref ?–0.7)
ENA AB SER QL IA: NEGATIVE

## 2025-08-07 ENCOUNTER — OFFICE VISIT (OUTPATIENT)
Facility: LOCATION | Age: 65
End: 2025-08-07

## 2025-08-07 DIAGNOSIS — H90.3 SENSORINEURAL HEARING LOSS, BILATERAL: Primary | ICD-10-CM

## 2025-08-07 DIAGNOSIS — H90.3 SENSORINEURAL HEARING LOSS (SNHL) OF BOTH EARS: Primary | ICD-10-CM

## 2025-08-07 DIAGNOSIS — J32.4 CHRONIC PANSINUSITIS: ICD-10-CM

## 2025-08-07 DIAGNOSIS — H93.13 TINNITUS OF BOTH EARS: ICD-10-CM

## 2025-08-07 PROCEDURE — 92557 COMPREHENSIVE HEARING TEST: CPT | Performed by: AUDIOLOGIST

## 2025-08-07 PROCEDURE — 99214 OFFICE O/P EST MOD 30 MIN: CPT | Performed by: OTOLARYNGOLOGY

## 2025-08-07 PROCEDURE — 92567 TYMPANOMETRY: CPT | Performed by: AUDIOLOGIST

## 2025-08-11 ENCOUNTER — TELEPHONE (OUTPATIENT)
Dept: SURGERY | Facility: CLINIC | Age: 65
End: 2025-08-11

## 2025-08-29 ENCOUNTER — HOSPITAL ENCOUNTER (OUTPATIENT)
Dept: GENERAL RADIOLOGY | Facility: HOSPITAL | Age: 65
Discharge: HOME | End: 2025-08-29
Attending: FAMILY MEDICINE

## 2025-08-29 DIAGNOSIS — R05.1 ACUTE COUGH: ICD-10-CM

## 2025-08-29 PROCEDURE — 71046 X-RAY EXAM CHEST 2 VIEWS: CPT | Performed by: FAMILY MEDICINE

## (undated) DIAGNOSIS — M79.672 LEFT FOOT PAIN: Primary | ICD-10-CM

## (undated) DEVICE — SYRINGE MED 20ML STD CLR PLAS LL TIP N CTRL

## (undated) DEVICE — NITINOL WIRE WITH HYDROPHILIC TIP: Brand: SENSOR

## (undated) DEVICE — SKN PREP SPNG STKS PVP PNT STR: Brand: MEDLINE INDUSTRIES, INC.

## (undated) DEVICE — SLEEVE COMPR M KNEE LEN SGL USE KENDALL SCD

## (undated) DEVICE — KENDALL SCD EXPRESS SLEEVES, KNEE LENGTH, MEDIUM: Brand: KENDALL SCD

## (undated) DEVICE — SOL  .9 3000ML

## (undated) DEVICE — CYSTO CDS-LF: Brand: MEDLINE INDUSTRIES, INC.

## (undated) DEVICE — NITINOL STONE RETRIEVAL BASKET: Brand: ZERO TIP

## (undated) DEVICE — SINGLE-USE DIGITAL FLEXIBLE URETEROSCOPE: Brand: LITHOVUE

## (undated) DEVICE — STERILE POLYISOPRENE POWDER-FREE SURGICAL GLOVES: Brand: PROTEXIS

## (undated) DEVICE — SEAL BX PRT Y ADPT ADJ

## (undated) DEVICE — Device

## (undated) DEVICE — SYRINGE 20CC LL TIP

## (undated) DEVICE — FIBER LSR 200UM 2J 80HZ 60W DL FOR LITHO

## (undated) DEVICE — SINGLE ACTION PUMPING SYSTEM

## (undated) DEVICE — URETERAL ACCESS SHEATH SET: Brand: NAVIGATOR HD

## (undated) DEVICE — SOLUTION IRRIG 3000ML 0.9% NACL FLX CONT

## (undated) DEVICE — ADHESIVE LIQ 2/3ML VI MASTISOL

## (undated) DEVICE — DILATOR/SHEATH SET: Brand: 8/10 DILATOR/SHEATH SET

## (undated) DEVICE — TIGERTAIL 5F FLXTIP 70CM

## (undated) DEVICE — SEAL BIOPSY PORT ACMI

## (undated) NOTE — LETTER
Gloria Portillo [address] [address]  [place], [address] [facility]    Consent for Operation  Date: __________________                                Time: _______________    1.  I authorize the performance upon [facility] the following operation:  Proce 6. I consent to the photographing or videotaping of the operations or procedures to be performed, including appropriate portions of my body for medical, scientific, or educational purposes, provided my identity is not revealed by the pictures or by descrip 10. If I have a Do Not Resuscitate order in place, the surgeon and I (or the individual authorized to consent on my behalf) will discuss and agree as to whether the Do Not Resuscitate order will remain in effect or will be discontinued during the performan a. Allow the anesthesiologist (anesthesia doctor) to give me medicine and do additional procedures as necessary.  Some examples are: Starting or using an “IV” to give me medicine, fluids or blood during my procedure, and having a breathing tube placed to he 7. Regional Anesthesia (“spinal”, “epidural”, & “nerve blocks”): I understand that rare but potential complications include headache, bleeding, infection, seizure, irregular heart rhythms, and nerve injury.     I can change my mind about having anesthesia

## (undated) NOTE — LETTER
OUTSIDE TESTING RESULT REQUEST     IMPORTANT: FOR YOUR IMMEDIATE ATTENTION  Please FAX all test results listed below to: [phone]     Testing already done on or about: .     * * * * If testing is NOT complete, arrange with patient A.S.A.P. * * * *      Patient Name: Werner Parks  Surgery Date: 2024  Medical Record: [id number]  CSN: [id number]  : 1960 - A: 63 y     Sex: male  Surgeon(s):  Paul Zapata MD  Procedure: CYSTOSCOPY, LEFT URETEROSCOPY, LASER LITHOTRIPSY, STENT PLACEMENT  Anesthesia Type: General     Surgeon: Paul Zapata MD     The following Testing and Time Line are REQUIRED PER ANESTHESIA     Urine C&S within  14 days  (1-2 weeks before surgery per surgeon order)      Thank You,   Sent by:Julissa Jacobson RN

## (undated) NOTE — LETTER
To Whom It May Concern:    Werner Parks is currently under my medical care and may not return to work at this time.  Please excuse Werner for 5 days days. He may return to work on 01/08/2024.    Activity is restricted as follows: none.    If you require additional information please contact our office.    Sincerely,    Paul Zapata MD  [address] [place] [address] [place] [id number] [phone]

## (undated) NOTE — LETTER
Gloria Portillo [address] [address]  [place], [address] [facility]    Consent for Operation  Date: __________________                                Time: _______________    1.  I authorize the performance upon [facility] the following operation:  Proce 6. I consent to the photographing or videotaping of the operations or procedures to be performed, including appropriate portions of my body for medical, scientific, or educational purposes, provided my identity is not revealed by the pictures or by descrip 10. If I have a Do Not Resuscitate order in place, the surgeon and I (or the individual authorized to consent on my behalf) will discuss and agree as to whether the Do Not Resuscitate order will remain in effect or will be discontinued during the performan a. Allow the anesthesiologist (anesthesia doctor) to give me medicine and do additional procedures as necessary.  Some examples are: Starting or using an “IV” to give me medicine, fluids or blood during my procedure, and having a breathing tube placed to he 7. Regional Anesthesia (“spinal”, “epidural”, & “nerve blocks”): I understand that rare but potential complications include headache, bleeding, infection, seizure, irregular heart rhythms, and nerve injury.     I can change my mind about having anesthesia